# Patient Record
Sex: FEMALE | Race: BLACK OR AFRICAN AMERICAN | NOT HISPANIC OR LATINO | Employment: UNEMPLOYED | RURAL
[De-identification: names, ages, dates, MRNs, and addresses within clinical notes are randomized per-mention and may not be internally consistent; named-entity substitution may affect disease eponyms.]

---

## 2020-07-07 ENCOUNTER — LAB (OUTPATIENT)
Dept: LAB | Facility: HOSPITAL | Age: 55
End: 2020-07-07

## 2020-07-07 ENCOUNTER — OFFICE VISIT (OUTPATIENT)
Dept: FAMILY MEDICINE CLINIC | Facility: CLINIC | Age: 55
End: 2020-07-07

## 2020-07-07 VITALS
OXYGEN SATURATION: 99 % | RESPIRATION RATE: 21 BRPM | DIASTOLIC BLOOD PRESSURE: 80 MMHG | BODY MASS INDEX: 29.44 KG/M2 | SYSTOLIC BLOOD PRESSURE: 122 MMHG | HEART RATE: 104 BPM | TEMPERATURE: 98.6 F | HEIGHT: 66 IN | WEIGHT: 183.2 LBS

## 2020-07-07 DIAGNOSIS — Z11.59 ENCOUNTER FOR HEPATITIS C SCREENING TEST FOR LOW RISK PATIENT: ICD-10-CM

## 2020-07-07 DIAGNOSIS — R53.83 FATIGUE, UNSPECIFIED TYPE: ICD-10-CM

## 2020-07-07 DIAGNOSIS — Z86.2 HISTORY OF ANEMIA: ICD-10-CM

## 2020-07-07 DIAGNOSIS — E78.5 HYPERLIPIDEMIA, UNSPECIFIED HYPERLIPIDEMIA TYPE: ICD-10-CM

## 2020-07-07 DIAGNOSIS — Z00.01 ANNUAL VISIT FOR GENERAL ADULT MEDICAL EXAMINATION WITH ABNORMAL FINDINGS: Primary | ICD-10-CM

## 2020-07-07 DIAGNOSIS — E55.9 VITAMIN D DEFICIENCY: ICD-10-CM

## 2020-07-07 DIAGNOSIS — R13.10 DYSPHAGIA, UNSPECIFIED TYPE: ICD-10-CM

## 2020-07-07 LAB
ALBUMIN SERPL-MCNC: 3.5 G/DL (ref 3.5–5.2)
ALBUMIN/GLOB SERPL: 0.6 G/DL
ALP SERPL-CCNC: 79 U/L (ref 39–117)
ALT SERPL W P-5'-P-CCNC: 7 U/L (ref 1–33)
ANION GAP SERPL CALCULATED.3IONS-SCNC: 11.4 MMOL/L (ref 5–15)
ANISOCYTOSIS BLD QL: ABNORMAL
AST SERPL-CCNC: 7 U/L (ref 1–32)
BASOPHILS # BLD MANUAL: 0.11 10*3/MM3 (ref 0–0.2)
BASOPHILS NFR BLD AUTO: 1 % (ref 0–1.5)
BILIRUB SERPL-MCNC: 0.3 MG/DL (ref 0–1.2)
BUN SERPL-MCNC: 7 MG/DL (ref 6–20)
BUN/CREAT SERPL: 9.3 (ref 7–25)
CALCIUM SPEC-SCNC: 11.4 MG/DL (ref 8.6–10.5)
CHLORIDE SERPL-SCNC: 99 MMOL/L (ref 98–107)
CO2 SERPL-SCNC: 24.6 MMOL/L (ref 22–29)
CREAT SERPL-MCNC: 0.75 MG/DL (ref 0.57–1)
DEPRECATED RDW RBC AUTO: 42.2 FL (ref 37–54)
ERYTHROCYTE [DISTWIDTH] IN BLOOD BY AUTOMATED COUNT: 19.9 % (ref 12.3–15.4)
GFR SERPL CREATININE-BSD FRML MDRD: 98 ML/MIN/1.73
GLOBULIN UR ELPH-MCNC: 5.9 GM/DL
GLUCOSE SERPL-MCNC: 82 MG/DL (ref 65–99)
HCT VFR BLD AUTO: 24 % (ref 34–46.6)
HGB BLD-MCNC: 7 G/DL (ref 12–15.9)
HYPOCHROMIA BLD QL: ABNORMAL
IRON 24H UR-MRATE: 17 MCG/DL (ref 37–145)
IRON SATN MFR SERPL: 9 % (ref 20–50)
LYMPHOCYTES # BLD MANUAL: 2.11 10*3/MM3 (ref 0.7–3.1)
LYMPHOCYTES NFR BLD MANUAL: 19 % (ref 19.6–45.3)
LYMPHOCYTES NFR BLD MANUAL: 7 % (ref 5–12)
MCH RBC QN AUTO: 18.3 PG (ref 26.6–33)
MCHC RBC AUTO-ENTMCNC: 29.2 G/DL (ref 31.5–35.7)
MCV RBC AUTO: 62.7 FL (ref 79–97)
MICROCYTES BLD QL: ABNORMAL
MONOCYTES # BLD AUTO: 0.78 10*3/MM3 (ref 0.1–0.9)
NEUTROPHILS # BLD AUTO: 8.11 10*3/MM3 (ref 1.7–7)
NEUTROPHILS NFR BLD MANUAL: 73 % (ref 42.7–76)
PLAT MORPH BLD: NORMAL
PLATELET # BLD AUTO: 586 10*3/MM3 (ref 140–450)
PMV BLD AUTO: 9.5 FL (ref 6–12)
POLYCHROMASIA BLD QL SMEAR: ABNORMAL
POTASSIUM SERPL-SCNC: 4.3 MMOL/L (ref 3.5–5.2)
PROT SERPL-MCNC: 9.4 G/DL (ref 6–8.5)
RBC # BLD AUTO: 3.83 10*6/MM3 (ref 3.77–5.28)
SODIUM SERPL-SCNC: 135 MMOL/L (ref 136–145)
T4 FREE SERPL-MCNC: 1.22 NG/DL (ref 0.93–1.7)
TIBC SERPL-MCNC: 186 MCG/DL (ref 298–536)
TRANSFERRIN SERPL-MCNC: 125 MG/DL (ref 200–360)
TSH SERPL DL<=0.05 MIU/L-ACNC: 1.12 UIU/ML (ref 0.27–4.2)
WBC # BLD AUTO: 11.11 10*3/MM3 (ref 3.4–10.8)
WBC MORPH BLD: NORMAL

## 2020-07-07 PROCEDURE — 84466 ASSAY OF TRANSFERRIN: CPT

## 2020-07-07 PROCEDURE — 86803 HEPATITIS C AB TEST: CPT

## 2020-07-07 PROCEDURE — 82306 VITAMIN D 25 HYDROXY: CPT

## 2020-07-07 PROCEDURE — 85007 BL SMEAR W/DIFF WBC COUNT: CPT

## 2020-07-07 PROCEDURE — 84443 ASSAY THYROID STIM HORMONE: CPT

## 2020-07-07 PROCEDURE — 80053 COMPREHEN METABOLIC PANEL: CPT

## 2020-07-07 PROCEDURE — 83540 ASSAY OF IRON: CPT

## 2020-07-07 PROCEDURE — 99386 PREV VISIT NEW AGE 40-64: CPT | Performed by: NURSE PRACTITIONER

## 2020-07-07 PROCEDURE — 84439 ASSAY OF FREE THYROXINE: CPT

## 2020-07-07 PROCEDURE — 85025 COMPLETE CBC W/AUTO DIFF WBC: CPT

## 2020-07-07 PROCEDURE — 80061 LIPID PANEL: CPT

## 2020-07-07 RX ORDER — IRBESARTAN 150 MG/1
300 TABLET ORAL NIGHTLY
COMMUNITY
End: 2020-08-07 | Stop reason: SDUPTHER

## 2020-07-07 RX ORDER — PANTOPRAZOLE SODIUM 40 MG/1
40 TABLET, DELAYED RELEASE ORAL DAILY
Qty: 30 TABLET | Refills: 2 | Status: SHIPPED | OUTPATIENT
Start: 2020-07-07 | End: 2020-08-17 | Stop reason: SDUPTHER

## 2020-07-07 RX ORDER — ATORVASTATIN CALCIUM 80 MG/1
80 TABLET, FILM COATED ORAL NIGHTLY
COMMUNITY
End: 2020-08-07 | Stop reason: SDUPTHER

## 2020-07-07 NOTE — PROGRESS NOTES
Chief Complaint   Patient presents with   • [facility]       Subjective:  Kinsey Martinez is a 54 y.o. female who presents to [facility]. Reports she was hospitalized in February in [place] for anemia and was given IV iron infusions. Reports feeling extremely fatigued despite taking Geritol. Reports hx of non-bleeding stomach ulcers that caused her to lose approx 80 pounds since last February. Reports having an upper scope approx Dec 2019 d/t problems with choking - unsure of result - no records to review. Last colonoscopy approx 2018; last PAP 2016 - reports polyps in uterus; unsure of last mammogram - reports cousin with breast CA but no first degree relative. Reports being on BP meds in the past - BP appears normal today. Reports 2 sisters passed d/t colon cancer.       The following portions of the patient's history were reviewed and updated as appropriate: allergies, current medications, past family history, past medical history, past social history, past surgical history and problem list.    Anemia   Presents for initial visit. Symptoms include light-headedness, malaise/fatigue and weight loss. There has been no abdominal pain, bruising/bleeding easily, fever or palpitations. Past treatments include oral iron supplements and parenteral iron supplements. Past medical history includes cancer and recent illness. Procedure history includes colonoscopy and EGD. Family history includes iron deficiency.   Fatigue   This is a chronic problem. The current episode started more than 1 year ago. The problem occurs intermittently. The problem has been waxing and waning. Associated symptoms include fatigue. Pertinent negatives include no abdominal pain, arthralgias, chest pain, congestion, coughing, fever, headaches, joint swelling, myalgias, nausea, swollen glands or vomiting. Nothing aggravates the symptoms. She has tried rest (OTC vitamin) for the symptoms. The treatment provided no relief.   Heartburn   She  complains of heartburn and water brash. She reports no abdominal pain, no chest pain, no coughing, no nausea or no wheezing. This is a chronic problem. The current episode started more than 1 year ago. The problem occurs occasionally. The problem has been waxing and waning. The heartburn is of moderate intensity. The symptoms are aggravated by certain foods. Associated symptoms include fatigue and weight loss. She has tried a PPI for the symptoms. The treatment provided moderate relief. Past procedures include an EGD and a UGI.        Past Medical History:   Diagnosis Date   • Anemia    • GERD (gastroesophageal reflux disease)    • Hypertension          Current Outpatient Medications:   •  atorvastatin (LIPITOR) 80 MG tablet, Take 80 mg by mouth Every Night., Disp: , Rfl:   •  irbesartan (AVAPRO) 150 MG tablet, Take 300 mg by mouth Every Night., Disp: , Rfl:   •  ascorbic acid (VITAMIN C) 1000 MG tablet, Take 1 tablet by mouth Daily., Disp: 30 tablet, Rfl: 0  •  ferrous sulfate (FerrouSul) 325 (65 FE) MG tablet, Take 1 tablet by mouth Daily With Breakfast., Disp: 30 tablet, Rfl: 0  •  Morphine (MSIR) 15 MG tablet, Take 1 tablet by mouth Every 6 (Six) Hours As Needed for Moderate Pain  or Severe Pain  for up to 3 days., Disp: 12 tablet, Rfl: 0  •  ondansetron (Zofran) 4 MG tablet, Take 1 tablet by mouth Every 8 (Eight) Hours As Needed for Nausea or Vomiting., Disp: 21 tablet, Rfl: 0  •  pantoprazole (Protonix) 40 MG EC tablet, Take 1 tablet by mouth Daily., Disp: 30 tablet, Rfl: 2  •  polyethylene glycol (MIRALAX) 17 g packet, Take 17g PO BID PRN constipation, Disp: 60 packet, Rfl: 1    Review of Systems    Review of Systems   Constitutional: Positive for fatigue, malaise/fatigue, unexpected weight change and weight loss. Negative for activity change, appetite change and fever.   HENT: Positive for trouble swallowing. Negative for congestion, dental problem, ear pain, sinus pain, tinnitus and voice change.    Eyes:  "Negative for photophobia, pain and visual disturbance.   Respiratory: Negative for cough, shortness of breath and wheezing.    Cardiovascular: Negative for chest pain, palpitations and leg swelling.   Gastrointestinal: Positive for heartburn. Negative for abdominal pain, blood in stool, constipation, diarrhea, nausea and vomiting.   Endocrine: Negative for cold intolerance, heat intolerance, polydipsia, polyphagia and polyuria.   Genitourinary: Negative for decreased urine volume, flank pain, frequency, pelvic pain and urgency.   Musculoskeletal: Negative for arthralgias, back pain, joint swelling and myalgias.   Allergic/Immunologic: Negative for environmental allergies, food allergies and immunocompromised state.   Neurological: Positive for light-headedness. Negative for dizziness, syncope and headaches.   Hematological: Negative for adenopathy. Does not bruise/bleed easily.   Psychiatric/Behavioral: Negative for sleep disturbance. The patient is not nervous/anxious.        Objective  Vitals:    07/07/20 1020   BP: 122/80   BP Location: Left arm   Patient Position: Sitting   Cuff Size: Adult   Pulse: 104   Resp: 21   Temp: 98.6 °F (37 °C)   SpO2: 99%   Weight: 83.1 kg (183 lb 3.2 oz)   Height: 167.6 cm (66\")       Physical Exam   Constitutional: She is oriented to person, place, and time. She appears well-developed and well-nourished. No distress.   HENT:   Head: Normocephalic and atraumatic.   Right Ear: External ear normal.   Left Ear: External ear normal.   Wearing face mask d/t pandemic   Eyes: Pupils are equal, round, and reactive to light. Conjunctivae and EOM are normal.   Neck: Normal range of motion. Neck supple. No thyromegaly present.   Cardiovascular: Normal rate, regular rhythm, normal heart sounds and intact distal pulses.   No murmur heard.  Pulmonary/Chest: Effort normal and breath sounds normal. No respiratory distress. She has no wheezes.   Abdominal: Soft. Bowel sounds are normal. "   Musculoskeletal: Normal range of motion. She exhibits no edema, tenderness or deformity.   Lymphadenopathy:     She has no cervical adenopathy.   Neurological: She is alert and oriented to person, place, and time.   Skin: Skin is warm and dry. Capillary refill takes less than 2 seconds.   Psychiatric: She has a normal mood and affect. Her behavior is normal. Judgment and thought content normal.   Nursing note and vitals reviewed.        Kinsey was seen today for establish care.    Diagnoses and all orders for this visit:    Annual visit for general adult medical examination with abnormal findings    Dysphagia, unspecified type  -     pantoprazole (Protonix) 40 MG EC tablet; Take 1 tablet by mouth Daily.    Fatigue, unspecified type  -     Comprehensive metabolic panel; Future  -     TSH; Future  -     T4, free; Future  -     Iron and TIBC; Future    History of anemia  -     CBC & Differential; Future  -     Iron and TIBC; Future    Encounter for hepatitis C screening test for low risk patient  -     Hepatitis C antibody; Future    Hyperlipidemia, unspecified hyperlipidemia type  -     Lipid Panel With LDL / HDL Ratio; Future    Vitamin D deficiency  -     Vitamin D 25 hydroxy; Future    1. Annual exam - limited records for review  2. Dysphagia - possibly r/t GERD - has been off PPI, will restart and f/u; if sx still present will refer to GI for further eval.  3. Hx of anemia - will obtain labs today and call with results.  4. Screening labs ordered - will call with results.  5. Discussed immunization needs - pt declined today.  6. Mammogram/PAP/colonoscopy - will address at f/u appt.  7. RTC approx 1 mo for f/u or PRN.      This document has been electronically signed by KIMBERLEE Winston on July 20, 2020 22:11

## 2020-07-08 LAB
25(OH)D3 SERPL-MCNC: 32.8 NG/ML (ref 30–100)
CHOLEST SERPL-MCNC: 158 MG/DL (ref 0–200)
HCV AB SER DONR QL: NORMAL
HDLC SERPL-MCNC: 38 MG/DL (ref 40–60)
LDLC SERPL CALC-MCNC: 105 MG/DL (ref 0–100)
LDLC/HDLC SERPL: 2.76 {RATIO}
TRIGL SERPL-MCNC: 76 MG/DL (ref 0–150)
VLDLC SERPL-MCNC: 15.2 MG/DL (ref 5–40)

## 2020-07-14 ENCOUNTER — OFFICE VISIT (OUTPATIENT)
Dept: FAMILY MEDICINE CLINIC | Facility: CLINIC | Age: 55
End: 2020-07-14

## 2020-07-14 VITALS
TEMPERATURE: 98.5 F | SYSTOLIC BLOOD PRESSURE: 124 MMHG | OXYGEN SATURATION: 99 % | BODY MASS INDEX: 29.51 KG/M2 | HEIGHT: 66 IN | DIASTOLIC BLOOD PRESSURE: 80 MMHG | HEART RATE: 101 BPM | RESPIRATION RATE: 17 BRPM | WEIGHT: 183.6 LBS

## 2020-07-14 DIAGNOSIS — R53.83 FATIGUE, UNSPECIFIED TYPE: Primary | ICD-10-CM

## 2020-07-14 DIAGNOSIS — D64.9 SYMPTOMATIC ANEMIA: ICD-10-CM

## 2020-07-14 DIAGNOSIS — K59.00 CONSTIPATION, UNSPECIFIED CONSTIPATION TYPE: ICD-10-CM

## 2020-07-14 PROCEDURE — 99213 OFFICE O/P EST LOW 20 MIN: CPT | Performed by: NURSE PRACTITIONER

## 2020-07-14 RX ORDER — POLYETHYLENE GLYCOL 3350 17 G/17G
POWDER, FOR SOLUTION ORAL
Qty: 60 PACKET | Refills: 1 | Status: SHIPPED | OUTPATIENT
Start: 2020-07-14

## 2020-07-14 NOTE — PROGRESS NOTES
"Chief Complaint   Patient presents with   • Follow-up     1 week f/u for fatigue        Subjective:  Kinsey Martinez is a 54 y.o. female who presents for f/u on fatigue. Pt reports no improvement in symptoms despite taking OTC Fe supp. Denies any active signs of bleeding. Does report hx of constipation and is currently having. Also here to discuss lab results. Also reports feeling of \"something in throat\" has improved since starting pantoprazole.       7/7/2020  Kinsey Martinez is a 54 y.o. female who presents to [facility]. Reports she was hospitalized in February in [place] for anemia and was given IV iron infusions. Reports feeling extremely fatigued despite taking Geritol. Reports hx of non-bleeding stomach ulcers that caused her to lose approx 80 pounds since last February. Reports having an upper scope approx Dec 2019 d/t problems with choking - unsure of result - no records to review. Last colonoscopy approx 2018; last PAP 2016 - reports polyps in uterus; unsure of last mammogram - reports cousin with breast CA but no first degree relative. Reports being on BP meds in the past - BP appears normal today. Reports 2 sisters passed d/t colon cancer.       The following portions of the patient's history were reviewed and updated as appropriate: allergies, current medications, past family history, past medical history, past social history, past surgical history and problem list.    Fatigue   This is a recurrent problem. The current episode started more than 1 month ago. The problem occurs daily. The problem has been unchanged. Associated symptoms include fatigue and weakness. Pertinent negatives include no coughing, fever, headaches, joint swelling, nausea, rash, sore throat, urinary symptoms or vomiting. The symptoms are aggravated by exertion. She has tried rest (iron supplement) for the symptoms. The treatment provided no relief.   Constipation   This is a chronic problem. The current episode started more " than 1 year ago. The problem has been waxing and waning since onset. The stool is described as firm. Associated symptoms include weight loss. Pertinent negatives include no diarrhea, fever, hematochezia, melena, nausea or vomiting. She has tried laxatives for the symptoms. The treatment provided mild relief.   Anemia   Presents for follow-up visit. Symptoms include malaise/fatigue and weight loss. There has been no fever, light-headedness or pallor. Signs of blood loss that are not present include hematemesis, hematochezia and melena.        Past Medical History:   Diagnosis Date   • Anemia    • GERD (gastroesophageal reflux disease)    • Hypertension          Current Outpatient Medications:   •  ascorbic acid (VITAMIN C) 1000 MG tablet, Take 1 tablet by mouth Daily., Disp: 30 tablet, Rfl: 0  •  atorvastatin (LIPITOR) 80 MG tablet, Take 80 mg by mouth Every Night., Disp: , Rfl:   •  ferrous sulfate (FerrouSul) 325 (65 FE) MG tablet, Take 1 tablet by mouth Daily With Breakfast., Disp: 30 tablet, Rfl: 0  •  irbesartan (AVAPRO) 150 MG tablet, Take 300 mg by mouth Every Night., Disp: , Rfl:   •  ondansetron (Zofran) 4 MG tablet, Take 1 tablet by mouth Every 8 (Eight) Hours As Needed for Nausea or Vomiting., Disp: 21 tablet, Rfl: 0  •  pantoprazole (Protonix) 40 MG EC tablet, Take 1 tablet by mouth Daily., Disp: 30 tablet, Rfl: 2  •  polyethylene glycol (MIRALAX) 17 g packet, Take 17g PO BID PRN constipation, Disp: 60 packet, Rfl: 1    Review of Systems    Review of Systems   Constitutional: Positive for fatigue, malaise/fatigue and weight loss. Negative for activity change, fever and unexpected weight change.   HENT: Negative for sore throat.    Respiratory: Negative for cough, chest tightness, shortness of breath and wheezing.    Gastrointestinal: Positive for constipation. Negative for blood in stool, diarrhea, hematemesis, hematochezia, melena, nausea and vomiting.   Genitourinary: Negative for dysuria and hematuria.  "  Musculoskeletal: Negative for joint swelling.   Skin: Negative for color change, pallor and rash.   Neurological: Positive for weakness. Negative for dizziness, light-headedness and headaches.   Psychiatric/Behavioral: Negative for sleep disturbance.       Objective  Vitals:    07/14/20 1539   BP: 124/80   BP Location: Left arm   Patient Position: Sitting   Cuff Size: Adult   Pulse: 101   Resp: 17   Temp: 98.5 °F (36.9 °C)   SpO2: 99%   Weight: 83.3 kg (183 lb 9.6 oz)   Height: 167.6 cm (66\")   PainSc: 0-No pain       Physical Exam   Constitutional: She is oriented to person, place, and time. She appears well-developed and well-nourished. No distress.   HENT:   Head: Normocephalic and atraumatic.   Wearing face mask d/t pandemic   Eyes: Pupils are equal, round, and reactive to light. Conjunctivae are normal.   Neck: Normal range of motion. Neck supple.   Cardiovascular: Regular rhythm. Tachycardia present.   Pulmonary/Chest: Effort normal and breath sounds normal. No respiratory distress.   Abdominal: Soft. Bowel sounds are normal.   Musculoskeletal: Normal range of motion.   Neurological: She is alert and oriented to person, place, and time.   Skin: Skin is warm and dry.   Psychiatric: She has a normal mood and affect. Her behavior is normal.   Nursing note and vitals reviewed.        Kinsey was seen today for follow-up.    Diagnoses and all orders for this visit:    Fatigue, unspecified type    Symptomatic anemia    Constipation, unspecified constipation type  -     polyethylene glycol (MIRALAX) 17 g packet; Take 17g PO BID PRN constipation    1. Fatigue/symptomatic anemia - Discussed low hgb level most likely cause of fatigue. Also discussed need for referral to hem/onc but pt states she is seeing Dr. Song now. Strongly encouraged pt to go to ER for further evaluation of low hgb. Pt v/u and stated she would go to ER first thing tomorrow but was unable to go today. Discussed importance of going to ER as soon " as possible as we are unsure at this point why her hgb is so low and if she is bleeding somewhere her hgb could fall even lower. Pt v/u and assured me she would go to ER ASAP.   2. Constipation - Miralax RX provided - however constipation may also be worsened d/t low hgb v/s oral Fe supp.  3. Advised to go to ER NOW. Pt v/u.  4. RTC at next scheduled f/u or PRN.      This document has been electronically signed by KIMBERLEE Winston on July 28, 2020 18:51

## 2020-07-15 ENCOUNTER — APPOINTMENT (OUTPATIENT)
Dept: CT IMAGING | Facility: HOSPITAL | Age: 55
End: 2020-07-15

## 2020-07-15 ENCOUNTER — ANESTHESIA (OUTPATIENT)
Dept: GASTROENTEROLOGY | Facility: HOSPITAL | Age: 55
End: 2020-07-15

## 2020-07-15 ENCOUNTER — ANESTHESIA EVENT (OUTPATIENT)
Dept: GASTROENTEROLOGY | Facility: HOSPITAL | Age: 55
End: 2020-07-15

## 2020-07-15 ENCOUNTER — HOSPITAL ENCOUNTER (OUTPATIENT)
Facility: HOSPITAL | Age: 55
Discharge: HOME | End: 2020-07-18
Attending: EMERGENCY MEDICINE | Admitting: INTERNAL MEDICINE

## 2020-07-15 DIAGNOSIS — D64.9 ANEMIA, UNSPECIFIED TYPE: Primary | ICD-10-CM

## 2020-07-15 DIAGNOSIS — N83.8 OVARIAN MASS: ICD-10-CM

## 2020-07-15 DIAGNOSIS — D64.9 SYMPTOMATIC ANEMIA: ICD-10-CM

## 2020-07-15 PROBLEM — G62.9 NEUROPATHY: Status: ACTIVE | Noted: 2020-07-15

## 2020-07-15 PROBLEM — D50.0 IRON DEFICIENCY ANEMIA DUE TO CHRONIC BLOOD LOSS: Status: ACTIVE | Noted: 2020-07-15

## 2020-07-15 LAB
ABO GROUP BLD: NORMAL
ABO GROUP BLD: NORMAL
ANION GAP SERPL CALCULATED.3IONS-SCNC: 11 MMOL/L (ref 5–15)
ANISOCYTOSIS BLD QL: ABNORMAL
APTT PPP: 35.4 SECONDS (ref 20–40.3)
BLD GP AB SCN SERPL QL: NEGATIVE
BUN SERPL-MCNC: 10 MG/DL (ref 6–20)
BUN/CREAT SERPL: 13.9 (ref 7–25)
CALCIUM SPEC-SCNC: 10.4 MG/DL (ref 8.6–10.5)
CHLORIDE SERPL-SCNC: 98 MMOL/L (ref 98–107)
CO2 SERPL-SCNC: 24 MMOL/L (ref 22–29)
CREAT SERPL-MCNC: 0.72 MG/DL (ref 0.57–1)
DEPRECATED RDW RBC AUTO: 46.6 FL (ref 37–54)
ERYTHROCYTE [DISTWIDTH] IN BLOOD BY AUTOMATED COUNT: 20.1 % (ref 12.3–15.4)
FERRITIN SERPL-MCNC: 957.4 NG/ML (ref 13–150)
GFR SERPL CREATININE-BSD FRML MDRD: 103 ML/MIN/1.73
GLUCOSE SERPL-MCNC: 99 MG/DL (ref 65–99)
HBA1C MFR BLD: 5.9 % (ref 4.8–5.6)
HCT VFR BLD AUTO: 23 % (ref 34–46.6)
HGB BLD-MCNC: 6.4 G/DL (ref 12–15.9)
HYPOCHROMIA BLD QL: ABNORMAL
INR PPP: 1.14 (ref 0.8–1.2)
IRON 24H UR-MRATE: 19 MCG/DL (ref 37–145)
IRON SATN MFR SERPL: 10 % (ref 20–50)
LYMPHOCYTES # BLD MANUAL: 1.87 10*3/MM3 (ref 0.7–3.1)
LYMPHOCYTES NFR BLD MANUAL: 16 % (ref 19.6–45.3)
LYMPHOCYTES NFR BLD MANUAL: 7 % (ref 5–12)
Lab: NORMAL
MCH RBC QN AUTO: 18.2 PG (ref 26.6–33)
MCHC RBC AUTO-ENTMCNC: 27.8 G/DL (ref 31.5–35.7)
MCV RBC AUTO: 65.3 FL (ref 79–97)
MONOCYTES # BLD AUTO: 0.82 10*3/MM3 (ref 0.1–0.9)
NEUTROPHILS # BLD AUTO: 9.02 10*3/MM3 (ref 1.7–7)
NEUTROPHILS NFR BLD MANUAL: 77 % (ref 42.7–76)
OVALOCYTES BLD QL SMEAR: ABNORMAL
PLATELET # BLD AUTO: 592 10*3/MM3 (ref 140–450)
PMV BLD AUTO: 8.7 FL (ref 6–12)
POTASSIUM SERPL-SCNC: 3.8 MMOL/L (ref 3.5–5.2)
PROTHROMBIN TIME: 15.1 SECONDS (ref 11.1–15.3)
RBC # BLD AUTO: 3.52 10*6/MM3 (ref 3.77–5.28)
RH BLD: POSITIVE
RH BLD: POSITIVE
SARS-COV-2 N GENE RESP QL NAA+PROBE: NOT DETECTED
SMALL PLATELETS BLD QL SMEAR: ABNORMAL
SODIUM SERPL-SCNC: 133 MMOL/L (ref 136–145)
T&S EXPIRATION DATE: NORMAL
TARGETS BLD QL SMEAR: ABNORMAL
TIBC SERPL-MCNC: 182 MCG/DL (ref 298–536)
TRANSFERRIN SERPL-MCNC: 122 MG/DL (ref 200–360)
WBC # BLD AUTO: 11.71 10*3/MM3 (ref 3.4–10.8)
WBC MORPH BLD: NORMAL

## 2020-07-15 PROCEDURE — 86923 COMPATIBILITY TEST ELECTRIC: CPT

## 2020-07-15 PROCEDURE — 36430 TRANSFUSION BLD/BLD COMPNT: CPT

## 2020-07-15 PROCEDURE — 86900 BLOOD TYPING SEROLOGIC ABO: CPT

## 2020-07-15 PROCEDURE — 83540 ASSAY OF IRON: CPT | Performed by: INTERNAL MEDICINE

## 2020-07-15 PROCEDURE — P9016 RBC LEUKOCYTES REDUCED: HCPCS

## 2020-07-15 PROCEDURE — 86901 BLOOD TYPING SEROLOGIC RH(D): CPT

## 2020-07-15 PROCEDURE — 85007 BL SMEAR W/DIFF WBC COUNT: CPT | Performed by: EMERGENCY MEDICINE

## 2020-07-15 PROCEDURE — 87635 SARS-COV-2 COVID-19 AMP PRB: CPT | Performed by: INTERNAL MEDICINE

## 2020-07-15 PROCEDURE — 85610 PROTHROMBIN TIME: CPT | Performed by: EMERGENCY MEDICINE

## 2020-07-15 PROCEDURE — G0378 HOSPITAL OBSERVATION PER HR: HCPCS

## 2020-07-15 PROCEDURE — 83036 HEMOGLOBIN GLYCOSYLATED A1C: CPT | Performed by: INTERNAL MEDICINE

## 2020-07-15 PROCEDURE — 43239 EGD BIOPSY SINGLE/MULTIPLE: CPT | Performed by: INTERNAL MEDICINE

## 2020-07-15 PROCEDURE — 85025 COMPLETE CBC W/AUTO DIFF WBC: CPT | Performed by: EMERGENCY MEDICINE

## 2020-07-15 PROCEDURE — 74177 CT ABD & PELVIS W/CONTRAST: CPT

## 2020-07-15 PROCEDURE — 80048 BASIC METABOLIC PNL TOTAL CA: CPT | Performed by: EMERGENCY MEDICINE

## 2020-07-15 PROCEDURE — 36415 COLL VENOUS BLD VENIPUNCTURE: CPT | Performed by: EMERGENCY MEDICINE

## 2020-07-15 PROCEDURE — 96374 THER/PROPH/DIAG INJ IV PUSH: CPT

## 2020-07-15 PROCEDURE — 99284 EMERGENCY DEPT VISIT MOD MDM: CPT

## 2020-07-15 PROCEDURE — 82728 ASSAY OF FERRITIN: CPT | Performed by: INTERNAL MEDICINE

## 2020-07-15 PROCEDURE — 25010000002 NA FERRIC GLUC CPLX PER 12.5 MG: Performed by: INTERNAL MEDICINE

## 2020-07-15 PROCEDURE — C9803 HOPD COVID-19 SPEC COLLECT: HCPCS | Performed by: INTERNAL MEDICINE

## 2020-07-15 PROCEDURE — 99219 PR INITIAL OBSERVATION CARE/DAY 50 MINUTES: CPT | Performed by: INTERNAL MEDICINE

## 2020-07-15 PROCEDURE — 86850 RBC ANTIBODY SCREEN: CPT | Performed by: EMERGENCY MEDICINE

## 2020-07-15 PROCEDURE — 25010000002 PROPOFOL 10 MG/ML EMULSION: Performed by: NURSE ANESTHETIST, CERTIFIED REGISTERED

## 2020-07-15 PROCEDURE — 84466 ASSAY OF TRANSFERRIN: CPT | Performed by: INTERNAL MEDICINE

## 2020-07-15 PROCEDURE — 86901 BLOOD TYPING SEROLOGIC RH(D): CPT | Performed by: EMERGENCY MEDICINE

## 2020-07-15 PROCEDURE — 25010000002 IOPAMIDOL 61 % SOLUTION: Performed by: INTERNAL MEDICINE

## 2020-07-15 PROCEDURE — 86900 BLOOD TYPING SEROLOGIC ABO: CPT | Performed by: EMERGENCY MEDICINE

## 2020-07-15 PROCEDURE — 85730 THROMBOPLASTIN TIME PARTIAL: CPT | Performed by: EMERGENCY MEDICINE

## 2020-07-15 RX ORDER — LOSARTAN POTASSIUM 50 MG/1
50 TABLET ORAL NIGHTLY
Status: DISCONTINUED | OUTPATIENT
Start: 2020-07-15 | End: 2020-07-18 | Stop reason: HOSPADM

## 2020-07-15 RX ORDER — PANTOPRAZOLE SODIUM 40 MG/10ML
40 INJECTION, POWDER, LYOPHILIZED, FOR SOLUTION INTRAVENOUS EVERY 12 HOURS SCHEDULED
Status: DISCONTINUED | OUTPATIENT
Start: 2020-07-15 | End: 2020-07-18 | Stop reason: HOSPADM

## 2020-07-15 RX ORDER — ACETAMINOPHEN 325 MG/1
650 TABLET ORAL EVERY 4 HOURS PRN
Status: DISCONTINUED | OUTPATIENT
Start: 2020-07-15 | End: 2020-07-18 | Stop reason: HOSPADM

## 2020-07-15 RX ORDER — PROMETHAZINE HYDROCHLORIDE 25 MG/ML
12.5 INJECTION, SOLUTION INTRAMUSCULAR; INTRAVENOUS ONCE AS NEEDED
Status: DISCONTINUED | OUTPATIENT
Start: 2020-07-15 | End: 2020-07-15 | Stop reason: HOSPADM

## 2020-07-15 RX ORDER — SODIUM CHLORIDE 0.9 % (FLUSH) 0.9 %
10 SYRINGE (ML) INJECTION AS NEEDED
Status: DISCONTINUED | OUTPATIENT
Start: 2020-07-15 | End: 2020-07-18 | Stop reason: HOSPADM

## 2020-07-15 RX ORDER — LIDOCAINE HYDROCHLORIDE 20 MG/ML
INJECTION, SOLUTION INTRAVENOUS AS NEEDED
Status: DISCONTINUED | OUTPATIENT
Start: 2020-07-15 | End: 2020-07-15 | Stop reason: SURG

## 2020-07-15 RX ORDER — POLYETHYLENE GLYCOL 3350 17 G/17G
17 POWDER, FOR SOLUTION ORAL DAILY
Status: DISCONTINUED | OUTPATIENT
Start: 2020-07-15 | End: 2020-07-18 | Stop reason: HOSPADM

## 2020-07-15 RX ORDER — ONDANSETRON 2 MG/ML
4 INJECTION INTRAMUSCULAR; INTRAVENOUS ONCE AS NEEDED
Status: DISCONTINUED | OUTPATIENT
Start: 2020-07-15 | End: 2020-07-15 | Stop reason: SDUPTHER

## 2020-07-15 RX ORDER — DOCUSATE SODIUM 100 MG/1
100 CAPSULE, LIQUID FILLED ORAL 2 TIMES DAILY PRN
Status: DISCONTINUED | OUTPATIENT
Start: 2020-07-15 | End: 2020-07-18 | Stop reason: HOSPADM

## 2020-07-15 RX ORDER — SODIUM CHLORIDE 0.9 % (FLUSH) 0.9 %
10 SYRINGE (ML) INJECTION EVERY 12 HOURS SCHEDULED
Status: DISCONTINUED | OUTPATIENT
Start: 2020-07-15 | End: 2020-07-18 | Stop reason: HOSPADM

## 2020-07-15 RX ORDER — PROMETHAZINE HYDROCHLORIDE 25 MG/1
25 SUPPOSITORY RECTAL ONCE AS NEEDED
Status: DISCONTINUED | OUTPATIENT
Start: 2020-07-15 | End: 2020-07-15 | Stop reason: HOSPADM

## 2020-07-15 RX ORDER — PROMETHAZINE HYDROCHLORIDE 25 MG/1
25 TABLET ORAL ONCE AS NEEDED
Status: DISCONTINUED | OUTPATIENT
Start: 2020-07-15 | End: 2020-07-15 | Stop reason: HOSPADM

## 2020-07-15 RX ORDER — SODIUM CHLORIDE 9 MG/ML
30 INJECTION, SOLUTION INTRAVENOUS CONTINUOUS PRN
Status: DISCONTINUED | OUTPATIENT
Start: 2020-07-15 | End: 2020-07-15

## 2020-07-15 RX ORDER — SODIUM FERRIC GLUCONATE COMPLEX IN SUCROSE 12.5 MG/ML
125 INJECTION INTRAVENOUS ONCE
Status: DISCONTINUED | OUTPATIENT
Start: 2020-07-15 | End: 2020-07-15

## 2020-07-15 RX ORDER — ONDANSETRON 2 MG/ML
4 INJECTION INTRAMUSCULAR; INTRAVENOUS EVERY 6 HOURS PRN
Status: DISCONTINUED | OUTPATIENT
Start: 2020-07-15 | End: 2020-07-18 | Stop reason: HOSPADM

## 2020-07-15 RX ORDER — SODIUM CHLORIDE 9 MG/ML
75 INJECTION, SOLUTION INTRAVENOUS CONTINUOUS
Status: DISCONTINUED | OUTPATIENT
Start: 2020-07-15 | End: 2020-07-18

## 2020-07-15 RX ORDER — PROPOFOL 10 MG/ML
VIAL (ML) INTRAVENOUS AS NEEDED
Status: DISCONTINUED | OUTPATIENT
Start: 2020-07-15 | End: 2020-07-15 | Stop reason: SURG

## 2020-07-15 RX ORDER — ATORVASTATIN CALCIUM 40 MG/1
80 TABLET, FILM COATED ORAL NIGHTLY
Status: DISCONTINUED | OUTPATIENT
Start: 2020-07-16 | End: 2020-07-18 | Stop reason: HOSPADM

## 2020-07-15 RX ADMIN — ACETAMINOPHEN 650 MG: 325 TABLET, FILM COATED ORAL at 19:32

## 2020-07-15 RX ADMIN — PROPOFOL 50 MG: 10 INJECTION, EMULSION INTRAVENOUS at 16:03

## 2020-07-15 RX ADMIN — IOPAMIDOL 90 ML: 612 INJECTION, SOLUTION INTRAVENOUS at 21:04

## 2020-07-15 RX ADMIN — LIDOCAINE HYDROCHLORIDE 50 MG: 20 INJECTION, SOLUTION INTRAVENOUS at 16:03

## 2020-07-15 RX ADMIN — SODIUM CHLORIDE 125 MG: 9 INJECTION, SOLUTION INTRAVENOUS at 21:34

## 2020-07-15 RX ADMIN — POLYETHYLENE GLYCOL 3350 17 G: 17 POWDER, FOR SOLUTION ORAL at 16:51

## 2020-07-15 RX ADMIN — PANTOPRAZOLE SODIUM 40 MG: 40 INJECTION, POWDER, FOR SOLUTION INTRAVENOUS at 13:35

## 2020-07-15 RX ADMIN — PROPOFOL 50 MG: 10 INJECTION, EMULSION INTRAVENOUS at 16:04

## 2020-07-15 RX ADMIN — SODIUM CHLORIDE 75 ML/HR: 9 INJECTION, SOLUTION INTRAVENOUS at 13:35

## 2020-07-15 RX ADMIN — PROPOFOL 50 MG: 10 INJECTION, EMULSION INTRAVENOUS at 16:05

## 2020-07-15 RX ADMIN — SODIUM CHLORIDE, PRESERVATIVE FREE 10 ML: 5 INJECTION INTRAVENOUS at 13:40

## 2020-07-15 RX ADMIN — SODIUM CHLORIDE, PRESERVATIVE FREE 10 ML: 5 INJECTION INTRAVENOUS at 20:34

## 2020-07-15 RX ADMIN — PANTOPRAZOLE SODIUM 40 MG: 40 INJECTION, POWDER, FOR SOLUTION INTRAVENOUS at 20:34

## 2020-07-15 NOTE — CONSULTS
SUBJECTIVE:   7/15/2020    Name: Kinsey Martinez  DOD: 1965    REASON FOR CONSULT: Anemia    Chief Complaint:     Chief Complaint   Patient presents with   • Anemia       Subjective     Patient is 54 y.o. female presents with complaint of fatigue.  Patient has a history of having epic ulcer disease.  Patient has been feeling weak and tired and stating some shortness of breath with walking stairs patient was seen by primary doctor who states she is very anemic and sent her to the emergency room patient has been admitted for evaluation.  Patient denies any blood in the stool at this time.  Patient has not had any melanotic bowel movements..      ROS/HISTORY/ CURRENT MEDICATIONS/OBJECTIVE/VS/PE:   Review of Systems:   Review of Systems    History:     Past Medical History:   Diagnosis Date   • Anemia    • GERD (gastroesophageal reflux disease)    • Hypertension      Past Surgical History:   Procedure Laterality Date   • CARDIAC CATHETERIZATION     •  SECTION     • COLONOSCOPY     • NERVE REPAIR     • POLYPECTOMY       Family History   Problem Relation Age of Onset   • Heart disease Mother         CHF   • Diabetes Mother    • Stroke Father    • Cancer Sister      Social History     Tobacco Use   • Smoking status: Never Smoker   • Smokeless tobacco: Never Used   Substance Use Topics   • Alcohol use: Yes     Frequency: Monthly or less     Comment: occassionally   • Drug use: Defer     Medications Prior to Admission   Medication Sig Dispense Refill Last Dose   • atorvastatin (LIPITOR) 80 MG tablet Take 80 mg by mouth Every Night.   7/15/2020 at Unknown time   • pantoprazole (Protonix) 40 MG EC tablet Take 1 tablet by mouth Daily. 30 tablet 2 7/15/2020 at Unknown time   • irbesartan (AVAPRO) 150 MG tablet Take 300 mg by mouth Every Night.   Taking   • polyethylene glycol (MIRALAX) 17 g packet Take 17g PO BID PRN constipation 60 packet 1      Allergies:  Aspirin; Codeine; and Tramadol    I have reviewed the  patient's medical history, surgical history and family history in the available medical record system.     Current Medications:     Current Facility-Administered Medications   Medication Dose Route Frequency Provider Last Rate Last Dose   • acetaminophen (TYLENOL) tablet 650 mg  650 mg Oral Q4H PRN Kareem Zavala MD       • atorvastatin (LIPITOR) tablet 80 mg  80 mg Oral Daily Kareem Zavala MD       • docusate sodium (COLACE) capsule 100 mg  100 mg Oral BID PRN Kareem Zavala MD       • losartan (COZAAR) tablet 50 mg  50 mg Oral Nightly Kareem Zavala MD       • ondansetron (ZOFRAN) injection 4 mg  4 mg Intravenous Q6H PRN Kareem Zavala MD       • pantoprazole (PROTONIX) injection 40 mg  40 mg Intravenous Q12H Kareem Zavala MD       • polyethylene glycol (MIRALAX) packet 17 g  17 g Oral Daily Kareem Zavala MD       • sodium chloride 0.9 % flush 10 mL  10 mL Intravenous PRN Kareem Zavala MD       • sodium chloride 0.9 % flush 10 mL  10 mL Intravenous Q12H Kareem Zavala MD       • sodium chloride 0.9 % flush 10 mL  10 mL Intravenous PRN Kareem Zavala MD       • sodium chloride 0.9 % infusion  75 mL/hr Intravenous Continuous Kareem Zavala MD       • sodium chloride 0.9 % infusion  30 mL/hr Intravenous Continuous PRN Fidel Carbajal MD           Objective     Physical Exam:   Temp:  [97.5 °F (36.4 °C)-98.5 °F (36.9 °C)] 97.5 °F (36.4 °C)  Heart Rate:  [] 85  Resp:  [16-18] 18  BP: (115-141)/(68-80) 141/74    Physical Exam:  General Appearance:    Alert, cooperative, in no acute distress   Head:    Normocephalic, without obvious abnormality, atraumatic   Eyes:            Lids and lashes normal, conjunctivae and sclerae normal, no   icterus, no pallor, corneas clear, PERRLA   Ears:    Ears appear intact with no abnormalities noted   Throat:   No oral lesions, no thrush, oral mucosa moist   Neck:   No adenopathy, supple, trachea midline, no thyromegaly,  no     carotid bruit, no JVD   Back:     No kyphosis present, no scoliosis present, no skin lesions,       erythema or scars, no tenderness to percussion or                   palpation,   range of motion normal   Lungs:     Clear to auscultation,respirations regular, even and                   unlabored    Heart:    Regular rhythm and normal rate, normal S1 and S2, no            murmur, no gallop, no rub, no click   Breast Exam:    Deferred   Abdomen:     Normal bowel sounds, no masses, no organomegaly, soft        non-tender, non-distended, no guarding, no rebound                 tenderness   Genitalia:    Deferred   Extremities:   Moves all extremities well, no edema, no cyanosis, no              redness   Pulses:   Pulses palpable and equal bilaterally   Skin:   No bleeding, bruising or rash   Lymph nodes:   No palpable adenopathy   Neurologic:   Cranial nerves 2 - 12 grossly intact, sensation intact, DTR        present and equal bilaterally      Results Review:     Lab Results   Component Value Date    WBC 11.71 (H) 07/15/2020    WBC 11.11 (H) 07/07/2020    HGB 6.4 (C) 07/15/2020    HGB 7.0 (L) 07/07/2020    HCT 23.0 (L) 07/15/2020    HCT 24.0 (L) 07/07/2020     (H) 07/15/2020     (H) 07/07/2020             No results found for: LIPASE  Lab Results   Component Value Date    INR 1.14 07/15/2020         Radiology Review:  Imaging Results (Last 72 Hours)     ** No results found for the last 72 hours. **          I reviewed the patient's new clinical results.    I reviewed the patient's new imaging results and agree with the interpretation.     ASSESSMENT/PLAN:   ASSESSMENT: Patient with symptomatic anemia.  Patient has a history of peptic ulcer disease currently not taking any medication patient denies any melena or blood in the stool at this time.  Patient peers to have a chronic anemia but will need blood transfusion at this time.  We will schedule patient for EGD to evaluate    PLAN: #1 because  patient's hemoglobin is less than 7 we will transfuse 2 units packed RBCs.  #2 we will schedule patient for EGD to be done today to evaluate for source of possible upper GI bleed.  The risks, benefits, and alternatives of this procedure have been discussed with the patient or the responsible party. The patient understands and agrees to proceed.         Fidel Carbajal MD  07/15/20  12:26         This document has been electronically signed by Fidel Carbajal MD on July 15, 2020 12:26

## 2020-07-15 NOTE — H&P
[facility] Medicine Services  INPATIENT HISTORY AND PHYSICAL       Patient Care Team:  Marlee Kilgore APRN as PCP - General (Family Medicine)    Chief complaint   Chief Complaint   Patient presents with   • Anemia       Subjective     Patient is a 54 y.o. female with a past medical history of gastric and esophageal ulcers, chronic anemia status post transfusion, essential hypertension and left leg neuropathy.  She presents with complaints of worsening fatigue and shortness of breath on exertion and weight loss of 70 pounds over the past 6 months.    Patient moved to this area from in March 2020.  She had previously been evaluated for chronic anemia with a colonoscopy in December 2018 which was unremarkable and EGD in December 2019 which showed esophageal and gastric ulcers.  Patient was on Protonix and iron therapy for chronic anemia.  However over the past 6 months to 1 year patient has experienced worsening fatigue, dyspnea on exertion as well as weight loss of 70 pounds.  She also has intermittent dizziness on standing.  She had routine blood work at a new PCP last week that showed anemia with hemoglobin of 7G/DL.  Subsequently advised to come to the emergency room for further evaluation.  In addition to the symptoms patient has some nausea and complains of intermittent regurgitation of food and nausea with vomiting episode 3 days ago.  She complains of periumbilical abdominal pain that radiates to the back but denies diarrhea or blood in stools.  Of note patient is on oral iron therapy and complains of constipation.  She denies fevers or chills.  She notes sharp neuropathic pain in her left leg from foot up to her hip and was previously on gabapentin but this was discontinued by her last primary care provider.    Review of Systems   Constitutional: Positive for fatigue. Negative for activity change, appetite change, chills and fever.   HENT: Negative for congestion,  sore throat and trouble swallowing.    Respiratory: Negative for cough, chest tightness, shortness of breath and wheezing.    Cardiovascular: Negative for chest pain, palpitations and leg swelling.   Gastrointestinal: Positive for abdominal pain, constipation, nausea and vomiting. Negative for abdominal distention and diarrhea.   Genitourinary: Negative for difficulty urinating, dysuria and hematuria.   Musculoskeletal: Negative for arthralgias, back pain and myalgias.   Skin: Negative for pallor and rash.   Neurological: Positive for dizziness. Negative for syncope, weakness, light-headedness and headaches.   Hematological: Negative for adenopathy. Does not bruise/bleed easily.   Psychiatric/Behavioral: Negative for agitation and confusion. The patient is not nervous/anxious.      History  Past Medical History:   Diagnosis Date   • Anemia    • GERD (gastroesophageal reflux disease)    • Hypertension      Past Surgical History:   Procedure Laterality Date   • CARDIAC CATHETERIZATION     •  SECTION     • COLONOSCOPY     • NERVE REPAIR     • POLYPECTOMY       Family History   Problem Relation Age of Onset   • Heart disease Mother         CHF   • Diabetes Mother    • Stroke Father    • Cancer Sister      Social History     Tobacco Use   • Smoking status: Never Smoker   • Smokeless tobacco: Never Used   Substance Use Topics   • Alcohol use: Yes     Frequency: Monthly or less     Comment: occassionally   • Drug use: Defer     Medications Prior to Admission   Medication Sig Dispense Refill Last Dose   • atorvastatin (LIPITOR) 80 MG tablet Take 80 mg by mouth Every Night.   7/15/2020 at Unknown time   • pantoprazole (Protonix) 40 MG EC tablet Take 1 tablet by mouth Daily. 30 tablet 2 7/15/2020 at Unknown time   • irbesartan (AVAPRO) 150 MG tablet Take 300 mg by mouth Every Night.   Taking   • polyethylene glycol (MIRALAX) 17 g packet Take 17g PO BID PRN constipation 60 packet 1      Allergies:  Aspirin; Codeine;  and Tramadol  Prior to Admission medications    Medication Sig Start Date End Date Taking? Authorizing Provider   atorvastatin (LIPITOR) 80 MG tablet Take 80 mg by mouth Every Night.   Yes ProviderRomario MD   pantoprazole (Protonix) 40 MG EC tablet Take 1 tablet by mouth Daily. 7/7/20  Yes Marlee Kilgore APRN   irbesartan (AVAPRO) 150 MG tablet Take 300 mg by mouth Every Night.    Provider, MD Romario   polyethylene glycol (MIRALAX) 17 g packet Take 17g PO BID PRN constipation 7/14/20   Marlee Kilgore APRN       I have reviewed the patient's current medications    Objective        Vital Signs  Temp:  [97 °F (36.1 °C)-98 °F (36.7 °C)] 97.5 °F (36.4 °C)  Heart Rate:  [] 81  Resp:  [16-18] 18  BP: (115-144)/(67-78) 144/77      Physical Exam   Constitutional: She is oriented to person, place, and time. She appears well-developed and well-nourished. No distress.   HENT:   Head: Normocephalic and atraumatic.   Mouth/Throat: No oropharyngeal exudate.   Eyes: Pupils are equal, round, and reactive to light. Conjunctivae and EOM are normal. No scleral icterus.   Neck: Normal range of motion. Neck supple. No JVD present. No tracheal deviation present. No thyromegaly present.   Cardiovascular: Normal rate, regular rhythm, normal heart sounds and intact distal pulses. Exam reveals no gallop and no friction rub.   No murmur heard.  Pulmonary/Chest: Effort normal and breath sounds normal. No stridor. No respiratory distress. She has no wheezes. She has no rales. She exhibits no tenderness.   Abdominal: Soft. Bowel sounds are normal. She exhibits no distension and no mass. There is tenderness. There is no rebound and no guarding. No hernia.   Tenderness in epigastric and periumbilical abdominal region.   Musculoskeletal: Normal range of motion. She exhibits no edema, tenderness or deformity.   Lymphadenopathy:     She has no cervical adenopathy.   Neurological: She is alert and oriented to person, place, and  time. No cranial nerve deficit. She exhibits normal muscle tone.   Skin: Skin is warm and dry. No rash noted. She is not diaphoretic. No erythema. No pallor.   Psychiatric: She has a normal mood and affect. Her behavior is normal. Judgment and thought content normal.       Results Review:     Results from last 7 days   Lab Units 07/15/20  0735   SODIUM mmol/L 133*   POTASSIUM mmol/L 3.8   CHLORIDE mmol/L 98   CO2 mmol/L 24.0   BUN mg/dL 10   CREATININE mg/dL 0.72   GLUCOSE mg/dL 99   CALCIUM mg/dL 10.4             Results from last 7 days   Lab Units 07/15/20  0735   WBC 10*3/mm3 11.71*   HEMOGLOBIN g/dL 6.4*   HEMATOCRIT % 23.0*   PLATELETS 10*3/mm3 592*       Results from last 7 days   Lab Units 07/15/20  0735   INR  1.14     Imaging Results (Last 7 Days)     ** No results found for the last 168 hours. **          Assessment / Plan       Hospital Problem List:  Principal Problem:    Symptomatic anemia  Active Problems:    Iron deficiency anemia due to chronic blood loss    Neuropathy  History of gastric and esophageal ulcers  Essential hypertension    Plan  - Patient has anemia with weight loss and previous history of gastric and esophageal ulcers.  Will have gastroenterology consultation for evaluation and upper GI endoscopy  -Transfuse 2 PRBCs  -IV Protonix 40 mg every 12 hours  -IV normal saline at 75 cc an hour  -We will have hematology oncology evaluation due to patient's anemia with history of neuropathy and weight loss. There is some concern for myeloma or hematologic disorder.  -Continue antihypertensive medications  - DVT prophylaxis with SCDs  -CODE STATUS is full code    I discussed the patient's findings and my recommendations with patient.    Kareem Zavala MD  07/15/20  15:47        Part of this note may be an electronic transcription/translation of spoken language to printed text using the Dragon Dictation System.

## 2020-07-15 NOTE — CONSULTS
Kinsey Martinez  [id number]  1965        REASON FOR CONSULTATION:  Anemia, neuropathy   Provide an opinion on any further workup or treatment                             REQUESTING PHYSICIAN:  Kareem Zavala MD    RECORDS OBTAINED:  Records of the patients history including those obtained from the referring provider were reviewed and summarized in detail.      History of Present Illness     This is a pleasant 54-year-old female who was seen in consultation at the request of Dr Zavala for evaluation of anemia and peripheral neuropathy.  Patient unfortunately is a poor historian and most of the history was obtained by reviewing old medical records.  She has past medical history of hypertension, neuropathy, anemia, peptic ulcer disease.  She recently moved from [place] to [place] and most of the care was at [facility] and we do not have these records available.  Patient was admitted to our hospital on July 15, 2020 with severe fatigue, shortness of breath.  In emergency room she was noticed to have severe anemia with hemoglobin of 6.4.  Her MCV was low at 65.3.  In addition, she also had thrombocytosis with platelet count of 592,000 and mild leukocytosis with white blood cell count of 11.71.  Patient tells me that she has been anemic since she was a child.  She has never seen hematologist for her anemia.  She is currently taking oral iron supplements without much benefit.  She has never received IV iron.  She has received blood transfusion in the past.  She tells me that she had esophageal and stomach ulcers in the past.  Her last colonoscopy was in December 2018 and upper endoscopy was in December 2019.  This was in [facility] and we do not have records available.  She denies any bright red blood per rectum or melena.  Denies any hematuria.  I been asked to assist with evaluation and management of her anemia and unexplained peripheral neuropathy.    Past Medical History:   Diagnosis Date   •  Anemia    • GERD (gastroesophageal reflux disease)    • Hypertension         Past Surgical History:   Procedure Laterality Date   • CARDIAC CATHETERIZATION     •  SECTION     • COLONOSCOPY     • NERVE REPAIR     • POLYPECTOMY          No current facility-administered medications on file prior to encounter.      Current Outpatient Medications on File Prior to Encounter   Medication Sig Dispense Refill   • atorvastatin (LIPITOR) 80 MG tablet Take 80 mg by mouth Every Night.     • pantoprazole (Protonix) 40 MG EC tablet Take 1 tablet by mouth Daily. 30 tablet 2   • irbesartan (AVAPRO) 150 MG tablet Take 300 mg by mouth Every Night.     • polyethylene glycol (MIRALAX) 17 g packet Take 17g PO BID PRN constipation 60 packet 1        ALLERGIES:    Allergies   Allergen Reactions   • Aspirin Unknown - High Severity   • Codeine Unknown - High Severity   • Tramadol Unknown - High Severity        Social History     Socioeconomic History   • Marital status: Single     Spouse name: Not on file   • Number of children: Not on file   • Years of education: Not on file   • Highest education level: Not on file   Tobacco Use   • Smoking status: Never Smoker   • Smokeless tobacco: Never Used   Substance and Sexual Activity   • Alcohol use: Yes     Frequency: Monthly or less     Comment: occassionally   • Drug use: Defer   • Sexual activity: Defer        Family History   Problem Relation Age of Onset   • Heart disease Mother         CHF   • Diabetes Mother    • Stroke Father    • Cancer Sister         Review of Systems   Constitutional: Positive for activity change, appetite change, fatigue and unexpected weight change. Negative for chills, diaphoresis and fever.   HENT: Negative for congestion, hearing loss, mouth sores, sinus pressure, sinus pain, sore throat and trouble swallowing.    Eyes: Negative for photophobia, pain and discharge.   Respiratory: Positive for shortness of breath. Negative for cough, chest tightness and  wheezing.    Cardiovascular: Positive for palpitations. Negative for chest pain and leg swelling.   Gastrointestinal: Positive for abdominal pain and nausea. Negative for blood in stool, constipation, diarrhea and vomiting.   Endocrine: Positive for cold intolerance. Negative for heat intolerance, polydipsia, polyphagia and polyuria.   Genitourinary: Negative for difficulty urinating, hematuria, menstrual problem and vaginal bleeding.   Musculoskeletal: Positive for arthralgias, back pain and myalgias. Negative for joint swelling.   Skin: Positive for pallor. Negative for color change, rash and wound.   Allergic/Immunologic: Negative for environmental allergies, food allergies and immunocompromised state.   Neurological: Positive for dizziness, weakness, light-headedness, numbness and headaches. Negative for tremors, seizures and syncope.   Hematological: Negative for adenopathy. Does not bruise/bleed easily.   Psychiatric/Behavioral: Positive for decreased concentration. Negative for agitation, confusion, dysphoric mood and suicidal ideas. The patient is not nervous/anxious.         Objective     Vitals:    07/15/20 1502 07/15/20 1600 07/15/20 1608 07/15/20 1725   BP: 144/77 133/74 133/74 122/60   BP Location: Left arm   Left arm   Patient Position: Lying   Lying   Pulse: 81 78 78 88   Resp: 18 16 16 18   Temp:  97.5 °F (36.4 °C)  98.1 °F (36.7 °C)   TempSrc:    Temporal   SpO2: 96% 100% 100% 100%   Weight:       Height:         No flowsheet data found.    Physical Exam   Constitutional: She is oriented to person, place, and time. She appears well-developed. No distress.   HENT:   Head: Normocephalic and atraumatic.   Nose: Nose normal.   Mouth/Throat: Oropharynx is clear and moist. No oropharyngeal exudate.   Eyes: Pupils are equal, round, and reactive to light. No scleral icterus.   Severe conjunctival pallor   Neck: Normal range of motion. Neck supple. No JVD present. No thyromegaly present.   Cardiovascular:  Normal rate, regular rhythm and normal heart sounds. Exam reveals no friction rub.   No murmur heard.  Pulmonary/Chest: Effort normal and breath sounds normal. No respiratory distress. She has no wheezes. She has no rales.   Abdominal: Soft. Bowel sounds are normal. She exhibits no mass. There is tenderness. There is no rebound and no guarding.   Musculoskeletal: Normal range of motion. She exhibits no edema or deformity.   Lymphadenopathy:     She has no cervical adenopathy.   Neurological: She is alert and oriented to person, place, and time. No cranial nerve deficit or sensory deficit. She exhibits normal muscle tone.   Skin: Skin is dry. No rash noted. There is pallor.   Psychiatric: She has a normal mood and affect. Her behavior is normal. Thought content normal.             RECENT LABS: Independently reviewed and summarized.  Hematology WBC   Date Value Ref Range Status   07/15/2020 11.71 (H) 3.40 - 10.80 10*3/mm3 Final     RBC   Date Value Ref Range Status   07/15/2020 3.52 (L) 3.77 - 5.28 10*6/mm3 Final     Hemoglobin   Date Value Ref Range Status   07/15/2020 6.4 (C) 12.0 - 15.9 g/dL Final     Comment:     SPECIMEN REANALYZED TO CONFIRM HGB RESULTS     Hematocrit   Date Value Ref Range Status   07/15/2020 23.0 (L) 34.0 - 46.6 % Final     Platelets   Date Value Ref Range Status   07/15/2020 592 (H) 140 - 450 10*3/mm3 Final        Result of upper endoscopy reviewed.  This showed gastritis without any peptic ulcer disease.  Normal esophagus and duodenum.    I will request old medical records from [facility] for us to review.    Diagnosis:   (1) Anemia   (2) Thrombocytosis  (3) Neuropathy   (4) Abdominal pain   (5) Unintentional weight loss     All are new diagnosis/problems for me.     Assessment/Plan     (1) Anemia     Patient admitted with severe anemia as well as microcytosis.  Patient reports history of lifelong anemia.  She never had any hematology evaluation performed.  She has been taking oral iron  supplements.  Now admitted with severe symptomatic anemia.  Given severity of anemia I would recommend 2 units of packed red blood cell transfusion.  Risk versus benefits of blood transfusion discussed and she was in agreement.  Her iron saturation was low so we will start her on IV iron today.  Risk versus benefit of IV iron including risk of allergic reaction discussed at length.  She was in agreement.  Her upper endoscopy did not show any bleeding source.  She might need colonoscopy to evaluate and rule out other causes of GI bleeding.  I will check vitamin B12, folic acid to rule out other nutritional causes of anemia.  She denies any history of thalassemia or sickle cell disorder.  However we will check hemoglobin electrophoresis to rule out concomitant thalassemias which can also cause microcytic anemia, especially given her history of lifelong anemia.      (2) Thrombocytosis: Likely this is secondary to iron deficiency.  She does not have any prior history of arterial venous thrombosis.  No palpable hepatosplenomegaly.  We will monitor platelet count closely and if no improvement after IV iron replacement I will consider MPN testing.    (3) Neuropathy: Unexplained.  I will check serum protein electrophoresis, immunofixation and free light chains to rule out plasma cell disorder.    (4) Abdominal pain (5) Unintentional weight loss     Patient has right lower quadrant abdominal pain and tenderness on exam.  Given history of unintentional weight loss I will order CT scan of abdomen pelvis to rule out underlying malignant process.    Thank you for involving me in Ms Martinez' care.     Please call us if any questions/concerns.     Mervin Kimbrough MD   Hematology Oncology

## 2020-07-15 NOTE — ANESTHESIA POSTPROCEDURE EVALUATION
Patient: Kinsey Martinez    Procedure Summary     Date:  07/15/20 Room / Location:  [facility] ENDOSCOPY 1 / [facility] ENDOSCOPY    Anesthesia Start:  1601 Anesthesia Stop:  1606    Procedure:  ESOPHAGOGASTRODUODENOSCOPY (N/A ) Diagnosis:       Symptomatic anemia      (Symptomatic anemia [D64.9])    Surgeon:  Fidel Carbajal MD Provider:  Teo Santos CRNA    Anesthesia Type:  MAC ASA Status:  3          Anesthesia Type: MAC    Vitals  No vitals data found for the desired time range.          Post Anesthesia Care and Evaluation    Patient location during evaluation: bedside  Patient participation: complete - patient cannot participate  Level of consciousness: awake  Pain score: 0  Pain management: adequate  Airway patency: patent  Anesthetic complications: No anesthetic complications  PONV Status: none  Cardiovascular status: acceptable  Respiratory status: acceptable  Hydration status: acceptable

## 2020-07-15 NOTE — ED PROVIDER NOTES
Subjective   Patient presents emergency department complaint of weakness and fatigue.  Patient notes that she has chronic anemia of unknown etiology.  Patient has had work-up prior in her prior home state of [place].  Patient states that her last transfusion was in March.  Patient states she did well for about 2 weeks and then began to feel weak and fatigued again before the coven outbreak.  Patient has recently established moved [place].  Patient established with a primary care physician and had some baseline labs drawn which showed a hemoglobin of 7.0.  Patient was referred to the emergency department for possible transfusion.  Patient denies any fevers chills or illnesses.  Patient states she is not losing blood anywhere no bright red per lactone.  Patient does have dark stools when she takes her iron and she is currently doing that at this time.  No melena.          Review of Systems   Constitutional: Positive for fatigue. Negative for appetite change, chills and fever.   HENT: Negative.  Negative for congestion.    Eyes: Negative.  Negative for photophobia and visual disturbance.   Respiratory: Negative.  Negative for cough, chest tightness and shortness of breath.    Cardiovascular: Negative.  Negative for chest pain and palpitations.   Gastrointestinal: Negative.  Negative for abdominal pain, constipation, diarrhea, nausea and vomiting.   Endocrine: Negative.    Genitourinary: Negative.  Negative for decreased urine volume, dysuria, flank pain and hematuria.   Musculoskeletal: Negative.  Negative for arthralgias, back pain, myalgias, neck pain and neck stiffness.   Skin: Negative.  Negative for pallor.   Neurological: Positive for weakness. Negative for dizziness, syncope, light-headedness, numbness and headaches.   Psychiatric/Behavioral: Negative.  Negative for confusion and suicidal ideas. The patient is not nervous/anxious.    All other systems reviewed and are negative.      Past Medical History:    Diagnosis Date   • Anemia    • GERD (gastroesophageal reflux disease)    • Hypertension        Allergies   Allergen Reactions   • Aspirin Unknown - High Severity   • Codeine Unknown - High Severity   • Tramadol Unknown - High Severity       Past Surgical History:   Procedure Laterality Date   • CARDIAC CATHETERIZATION     •  SECTION     • COLONOSCOPY     • NERVE REPAIR     • POLYPECTOMY         Family History   Problem Relation Age of Onset   • Heart disease Mother         CHF   • Diabetes Mother    • Stroke Father    • Cancer Sister        Social History     Socioeconomic History   • Marital status: Single     Spouse name: Not on file   • Number of children: Not on file   • Years of education: Not on file   • Highest education level: Not on file   Tobacco Use   • Smoking status: Never Smoker   • Smokeless tobacco: Never Used   Substance and Sexual Activity   • Alcohol use: Yes     Frequency: Monthly or less     Comment: occassionally   • Drug use: Defer   • Sexual activity: Defer           Objective   Physical Exam   Constitutional: She is oriented to person, place, and time. She appears well-developed and well-nourished. No distress.   HENT:   Head: Normocephalic and atraumatic.   Nose: Nose normal.   Mouth/Throat: Oropharynx is clear and moist.   Eyes: Conjunctivae and EOM are normal. No scleral icterus.   Neck: Normal range of motion. Neck supple. No JVD present.   Cardiovascular: Normal rate, regular rhythm, normal heart sounds and intact distal pulses. Exam reveals no gallop and no friction rub.   No murmur heard.  Pulmonary/Chest: Effort normal. No respiratory distress. She has no wheezes. She has no rales. She exhibits no tenderness.   Abdominal: Soft. She exhibits no distension and no mass. There is no tenderness. There is no rebound and no guarding.   Musculoskeletal: Normal range of motion. She exhibits no edema, tenderness or deformity.   Lymphadenopathy:     She has no cervical adenopathy.    Neurological: She is alert and oriented to person, place, and time. No cranial nerve deficit. She exhibits normal muscle tone.   Skin: Skin is warm and dry. No rash noted. She is not diaphoretic. No erythema. There is pallor.   Psychiatric: She has a normal mood and affect. Her behavior is normal. Judgment and thought content normal.   Nursing note and vitals reviewed.      Procedures           ED Course                                 Labs Reviewed   BASIC METABOLIC PANEL - Abnormal; Notable for the following components:       Result Value    Sodium 133 (*)     All other components within normal limits    Narrative:     GFR Normal >60  Chronic Kidney Disease <60  Kidney Failure <15     CBC WITH AUTO DIFFERENTIAL - Abnormal; Notable for the following components:    WBC 11.71 (*)     RBC 3.52 (*)     Hemoglobin 6.4 (*)     Hematocrit 23.0 (*)     MCV 65.3 (*)     MCH 18.2 (*)     MCHC 27.8 (*)     RDW 20.1 (*)     Platelets 592 (*)     All other components within normal limits   MANUAL DIFFERENTIAL - Abnormal; Notable for the following components:    Neutrophil % 77.0 (*)     Lymphocyte % 16.0 (*)     Neutrophils Absolute 9.02 (*)     All other components within normal limits   IRON PROFILE - Abnormal; Notable for the following components:    Iron 19 (*)     Iron Saturation 10 (*)     Transferrin 122 (*)     TIBC 182 (*)     All other components within normal limits   FERRITIN - Abnormal; Notable for the following components:    Ferritin 957.40 (*)     All other components within normal limits    Narrative:     Results may be falsely decreased if patient taking Biotin.     HEMOGLOBIN A1C - Abnormal; Notable for the following components:    Hemoglobin A1C 5.90 (*)     All other components within normal limits    Narrative:     Hemoglobin A1C Ranges:    Increased Risk for Diabetes  5.7% to 6.4%  Diabetes                     >= 6.5%  Diabetic Goal                < 7.0%   PROTIME-INR - Normal    Narrative:      Therapeutic range for most indications is 2.0-3.0 INR,  or 2.5-3.5 for mechanical heart valves.   APTT - Normal    Narrative:     The recommended Heparin therapeutic range is 68-97 seconds.   COVID PRE-OP / PRE-PROCEDURE SCREENING ORDER (NO ISOLATION)    Narrative:     The following orders were created for panel order COVID PRE-OP / PRE-PROCEDURE SCREENING ORDER (NO ISOLATION) - Swab, Nasopharynx.  Procedure                               Abnormality         Status                     ---------                               -----------         ------                     COVID-19,  [facility] IN-HOUS...[[id number]]                      In process                   Please view results for these tests on the individual orders.   COVID-19, [facility] IN-HOUSE, NP SWAB IN TRANSPORT MEDIA 8-10 HR TAT   TYPE AND SCREEN   PREVIOUS HISTORY   [facility] 2ND SPECIMEN VERIFICATION   PREPARE RBC   TISSUE PATHOLOGY EXAM   CBC AND DIFFERENTIAL    Narrative:     The following orders were created for panel order CBC & Differential.  Procedure                               Abnormality         Status                     ---------                               -----------         ------                     CBC Auto Differential[[id number]]        Abnormal            Final result                 Please view results for these tests on the individual orders.   EXTRA TUBES    Narrative:     The following orders were created for panel order Extra Tubes.  Procedure                               Abnormality         Status                     ---------                               -----------         ------                     Gold Top - SST[[id number]]                                                                Please view results for these tests on the individual orders.   GOLD TOP - SST       No orders to display     Current symptomatic anemia with hemoglobin of 6.4.  Patient will be admitted for transfusion and further work-up.          [facility]    Final  diagnoses:   Anemia, unspecified type   Symptomatic anemia            Diego Frank MD  07/15/20 7848

## 2020-07-15 NOTE — ANESTHESIA POSTPROCEDURE EVALUATION
Patient: Kinsey Martinez    Procedure Summary     Date:  07/15/20 Room / Location:  [facility] ENDOSCOPY 1 / [facility] ENDOSCOPY    Anesthesia Start:  1601 Anesthesia Stop:  1606    Procedure:  ESOPHAGOGASTRODUODENOSCOPY (N/A ) Diagnosis:       Symptomatic anemia      (Symptomatic anemia [D64.9])    Surgeon:  Fidel Carbajal MD Provider:  Teo Santos CRNA    Anesthesia Type:  MAC ASA Status:  3          Anesthesia Type: MAC    Vitals  No vitals data found for the desired time range.          Post Anesthesia Care and Evaluation    Patient location during evaluation: bedside  Patient participation: complete - patient cannot participate  Level of consciousness: awake  Pain score: 0  Pain management: adequate  Airway patency: patent  Anesthetic complications: No anesthetic complications  PONV Status: none  Cardiovascular status: acceptable  Respiratory status: acceptable  Hydration status: acceptable

## 2020-07-15 NOTE — ED TRIAGE NOTES
Pt had blood drawn around a week ago and received results yesterday. Hgb 7.0 Hct 24. Pt referred to ED by PCP.

## 2020-07-15 NOTE — ANESTHESIA PREPROCEDURE EVALUATION
Anesthesia Evaluation     NPO Solid Status: > 8 hours  NPO Liquid Status: > 8 hours           Airway   Mallampati: III  TM distance: >3 FB  Neck ROM: full  No difficulty expected  Dental - normal exam     Pulmonary - normal exam   Cardiovascular - normal exam    (+) hypertension,       Neuro/Psych  GI/Hepatic/Renal/Endo    (+)  GERD,      Musculoskeletal     Abdominal    Substance History      OB/GYN          Other                        Anesthesia Plan    ASA 3     MAC     intravenous induction     Anesthetic plan, all risks, benefits, and alternatives have been provided, discussed and informed consent has been obtained with: patient.

## 2020-07-15 NOTE — SIGNIFICANT NOTE
Blood transfusion consent discussion documentation    I discussed the need for blood transfusion with the patient and included the possible benefits of increased oxygenation and improvement in fatigue. I also informed patient about possible risks including allergic reactions, fever and infection with diseases such as viral hepatitis, Acquired Immune Deficiency Syndrome (AIDS), and other possible infections. Although in most cases the risks and consequences are small, in some cases serious injury and/or death may result.  ?  Patient expressed full understanding of the risks and benefits of blood transfusion and gave consent for blood and blood product transfusion.

## 2020-07-16 ENCOUNTER — APPOINTMENT (OUTPATIENT)
Dept: GENERAL RADIOLOGY | Facility: HOSPITAL | Age: 55
End: 2020-07-16

## 2020-07-16 LAB
ANION GAP SERPL CALCULATED.3IONS-SCNC: 10 MMOL/L (ref 5–15)
BASOPHILS # BLD AUTO: 0.04 10*3/MM3 (ref 0–0.2)
BASOPHILS NFR BLD AUTO: 0.3 % (ref 0–1.5)
BUN SERPL-MCNC: 7 MG/DL (ref 6–20)
BUN/CREAT SERPL: 10.1 (ref 7–25)
CALCIUM SPEC-SCNC: 9.7 MG/DL (ref 8.6–10.5)
CANCER AG125 SERPL QL: 522.8 U/ML (ref 0–38.1)
CHLORIDE SERPL-SCNC: 102 MMOL/L (ref 98–107)
CO2 SERPL-SCNC: 22 MMOL/L (ref 22–29)
CREAT SERPL-MCNC: 0.69 MG/DL (ref 0.57–1)
DEPRECATED RDW RBC AUTO: 50.3 FL (ref 37–54)
EOSINOPHIL # BLD AUTO: 0.07 10*3/MM3 (ref 0–0.4)
EOSINOPHIL NFR BLD AUTO: 0.5 % (ref 0.3–6.2)
ERYTHROCYTE [DISTWIDTH] IN BLOOD BY AUTOMATED COUNT: 20.8 % (ref 12.3–15.4)
FOLATE SERPL-MCNC: 9.07 NG/ML (ref 4.78–24.2)
GFR SERPL CREATININE-BSD FRML MDRD: 108 ML/MIN/1.73
GLUCOSE SERPL-MCNC: 92 MG/DL (ref 65–99)
HCT VFR BLD AUTO: 28 % (ref 34–46.6)
HGB BLD-MCNC: 8.3 G/DL (ref 12–15.9)
HGB RETIC QN AUTO: 20.5 PG (ref 29.8–36.1)
IMM GRANULOCYTES # BLD AUTO: 0.05 10*3/MM3 (ref 0–0.05)
IMM GRANULOCYTES NFR BLD AUTO: 0.4 % (ref 0–0.5)
IMM RETICS NFR: 22.9 % (ref 3–15.8)
LYMPHOCYTES # BLD AUTO: 2.38 10*3/MM3 (ref 0.7–3.1)
LYMPHOCYTES NFR BLD AUTO: 17.9 % (ref 19.6–45.3)
MCH RBC QN AUTO: 20.1 PG (ref 26.6–33)
MCHC RBC AUTO-ENTMCNC: 29.6 G/DL (ref 31.5–35.7)
MCV RBC AUTO: 68 FL (ref 79–97)
MONOCYTES # BLD AUTO: 1.04 10*3/MM3 (ref 0.1–0.9)
MONOCYTES NFR BLD AUTO: 7.8 % (ref 5–12)
NEUTROPHILS NFR BLD AUTO: 73.1 % (ref 42.7–76)
NEUTROPHILS NFR BLD AUTO: 9.71 10*3/MM3 (ref 1.7–7)
NRBC BLD AUTO-RTO: 0 /100 WBC (ref 0–0.2)
PLATELET # BLD AUTO: 528 10*3/MM3 (ref 140–450)
PMV BLD AUTO: 8.6 FL (ref 6–12)
POTASSIUM SERPL-SCNC: 4 MMOL/L (ref 3.5–5.2)
RBC # BLD AUTO: 4.12 10*6/MM3 (ref 3.77–5.28)
RETICS/RBC NFR AUTO: 0.94 % (ref 0.7–1.9)
SODIUM SERPL-SCNC: 134 MMOL/L (ref 136–145)
VIT B12 BLD-MCNC: 590 PG/ML (ref 211–946)
WBC # BLD AUTO: 13.29 10*3/MM3 (ref 3.4–10.8)

## 2020-07-16 PROCEDURE — 82784 ASSAY IGA/IGD/IGG/IGM EACH: CPT | Performed by: INTERNAL MEDICINE

## 2020-07-16 PROCEDURE — 83883 ASSAY NEPHELOMETRY NOT SPEC: CPT | Performed by: INTERNAL MEDICINE

## 2020-07-16 PROCEDURE — 82607 VITAMIN B-12: CPT | Performed by: INTERNAL MEDICINE

## 2020-07-16 PROCEDURE — 84165 PROTEIN E-PHORESIS SERUM: CPT | Performed by: INTERNAL MEDICINE

## 2020-07-16 PROCEDURE — 99243 OFF/OP CNSLTJ NEW/EST LOW 30: CPT | Performed by: OBSTETRICS & GYNECOLOGY

## 2020-07-16 PROCEDURE — 83021 HEMOGLOBIN CHROMOTOGRAPHY: CPT | Performed by: INTERNAL MEDICINE

## 2020-07-16 PROCEDURE — 82378 CARCINOEMBRYONIC ANTIGEN: CPT | Performed by: INTERNAL MEDICINE

## 2020-07-16 PROCEDURE — 99226 PR SBSQ OBSERVATION CARE/DAY 35 MINUTES: CPT | Performed by: INTERNAL MEDICINE

## 2020-07-16 PROCEDURE — G0378 HOSPITAL OBSERVATION PER HR: HCPCS

## 2020-07-16 PROCEDURE — 99214 OFFICE O/P EST MOD 30 MIN: CPT | Performed by: INTERNAL MEDICINE

## 2020-07-16 PROCEDURE — 85046 RETICYTE/HGB CONCENTRATE: CPT | Performed by: INTERNAL MEDICINE

## 2020-07-16 PROCEDURE — 86304 IMMUNOASSAY TUMOR CA 125: CPT | Performed by: INTERNAL MEDICINE

## 2020-07-16 PROCEDURE — 71045 X-RAY EXAM CHEST 1 VIEW: CPT

## 2020-07-16 PROCEDURE — 80048 BASIC METABOLIC PNL TOTAL CA: CPT | Performed by: INTERNAL MEDICINE

## 2020-07-16 PROCEDURE — 82746 ASSAY OF FOLIC ACID SERUM: CPT | Performed by: INTERNAL MEDICINE

## 2020-07-16 PROCEDURE — 85025 COMPLETE CBC W/AUTO DIFF WBC: CPT | Performed by: INTERNAL MEDICINE

## 2020-07-16 PROCEDURE — 86334 IMMUNOFIX E-PHORESIS SERUM: CPT | Performed by: INTERNAL MEDICINE

## 2020-07-16 PROCEDURE — 84155 ASSAY OF PROTEIN SERUM: CPT | Performed by: INTERNAL MEDICINE

## 2020-07-16 PROCEDURE — 25010000002 NA FERRIC GLUC CPLX PER 12.5 MG: Performed by: INTERNAL MEDICINE

## 2020-07-16 PROCEDURE — 85660 RBC SICKLE CELL TEST: CPT | Performed by: INTERNAL MEDICINE

## 2020-07-16 RX ORDER — MORPHINE SULFATE 15 MG/1
15 TABLET ORAL EVERY 6 HOURS PRN
Status: DISCONTINUED | OUTPATIENT
Start: 2020-07-16 | End: 2020-07-18 | Stop reason: HOSPADM

## 2020-07-16 RX ORDER — SODIUM CHLORIDE 9 MG/ML
30 INJECTION, SOLUTION INTRAVENOUS CONTINUOUS PRN
Status: DISCONTINUED | OUTPATIENT
Start: 2020-07-16 | End: 2020-07-18 | Stop reason: HOSPADM

## 2020-07-16 RX ADMIN — PANTOPRAZOLE SODIUM 40 MG: 40 INJECTION, POWDER, FOR SOLUTION INTRAVENOUS at 08:32

## 2020-07-16 RX ADMIN — SODIUM CHLORIDE 125 MG: 9 INJECTION, SOLUTION INTRAVENOUS at 18:31

## 2020-07-16 RX ADMIN — SODIUM CHLORIDE 75 ML/HR: 9 INJECTION, SOLUTION INTRAVENOUS at 11:46

## 2020-07-16 RX ADMIN — MORPHINE SULFATE 15 MG: 15 TABLET ORAL at 13:59

## 2020-07-16 RX ADMIN — PANTOPRAZOLE SODIUM 40 MG: 40 INJECTION, POWDER, FOR SOLUTION INTRAVENOUS at 20:14

## 2020-07-16 RX ADMIN — POLYETHYLENE GLYCOL 3350 17 G: 17 POWDER, FOR SOLUTION ORAL at 08:33

## 2020-07-16 RX ADMIN — ATORVASTATIN CALCIUM 80 MG: 40 TABLET, FILM COATED ORAL at 20:14

## 2020-07-16 RX ADMIN — SODIUM CHLORIDE, PRESERVATIVE FREE 10 ML: 5 INJECTION INTRAVENOUS at 20:14

## 2020-07-16 RX ADMIN — POLYETHYLENE GLYCOL 3350, SODIUM SULFATE ANHYDROUS, SODIUM BICARBONATE, SODIUM CHLORIDE, POTASSIUM CHLORIDE 4000 ML: 236; 22.74; 6.74; 5.86; 2.97 POWDER, FOR SOLUTION ORAL at 17:35

## 2020-07-16 RX ADMIN — LOSARTAN POTASSIUM 50 MG: 50 TABLET, FILM COATED ORAL at 20:14

## 2020-07-16 NOTE — PROGRESS NOTES
SUBJECTIVE:   2020  Chief Complaint:     Subjective      Patient is 54 y.o. female with symptomatic anemia.  EGD did not show any source of bleeding.  Patient has not had a recent colonoscopy.    History:  Past Medical History:   Diagnosis Date   • Anemia    • GERD (gastroesophageal reflux disease)    • Hypertension      Past Surgical History:   Procedure Laterality Date   • CARDIAC CATHETERIZATION     •  SECTION     • COLONOSCOPY     • NERVE REPAIR     • POLYPECTOMY       Family History   Problem Relation Age of Onset   • Heart disease Mother         CHF   • Diabetes Mother    • Stroke Father    • Cancer Sister      Social History     Tobacco Use   • Smoking status: Never Smoker   • Smokeless tobacco: Never Used   Substance Use Topics   • Alcohol use: Yes     Frequency: Monthly or less     Comment: occassionally   • Drug use: Defer     Medications Prior to Admission   Medication Sig Dispense Refill Last Dose   • atorvastatin (LIPITOR) 80 MG tablet Take 80 mg by mouth Every Night.   7/15/2020 at Unknown time   • pantoprazole (Protonix) 40 MG EC tablet Take 1 tablet by mouth Daily. 30 tablet 2 7/15/2020 at Unknown time   • irbesartan (AVAPRO) 150 MG tablet Take 300 mg by mouth Every Night.   Taking   • polyethylene glycol (MIRALAX) 17 g packet Take 17g PO BID PRN constipation 60 packet 1      Allergies:  Aspirin; Codeine; and Tramadol     CURRENT MEDICATIONS/OBJECTIVE/VS/PE:     Current Medications:     Current Facility-Administered Medications   Medication Dose Route Frequency Provider Last Rate Last Dose   • acetaminophen (TYLENOL) tablet 650 mg  650 mg Oral Q4H PRN Fidel Carbajal MD   650 mg at 07/15/20 1932   • atorvastatin (LIPITOR) tablet 80 mg  80 mg Oral Nightly Fidel Carbajal MD       • docusate sodium (COLACE) capsule 100 mg  100 mg Oral BID PRN Fidel Carbajal MD       • losartan (COZAAR) tablet 50 mg  50 mg Oral Nightly Fidel Carbajal MD       • Morphine (MSIR) tablet 15 mg  15  mg Oral Q6H PRN Maria Kimbrough MD       • ondansetron (ZOFRAN) injection 4 mg  4 mg Intravenous Q6H PRN Fidel Carbajal MD       • pantoprazole (PROTONIX) injection 40 mg  40 mg Intravenous Q12H Fidel Carbajal MD   40 mg at 07/16/20 0832   • polyethylene glycol (GoLYTELY) solution 4,000 mL  4,000 mL Oral Once Fidel Carbajal MD       • polyethylene glycol (MIRALAX) packet 17 g  17 g Oral Daily Fidel Carbajal MD   17 g at 07/16/20 0833   • sodium chloride 0.9 % flush 10 mL  10 mL Intravenous PRN Fidel Carbajal MD       • sodium chloride 0.9 % flush 10 mL  10 mL Intravenous Q12H Fidel Carbajal MD   10 mL at 07/15/20 2034   • sodium chloride 0.9 % flush 10 mL  10 mL Intravenous PRN Fidel Carbajal MD       • sodium chloride 0.9 % infusion  75 mL/hr Intravenous Continuous Fidel Carbajal MD 75 mL/hr at 07/16/20 1146 75 mL/hr at 07/16/20 1146   • sodium chloride 0.9 % infusion  30 mL/hr Intravenous Continuous PRN Fidel Carbajal MD           Objective     Review of Systems:   Review of Systems    Physical Exam:   Temp:  [97 °F (36.1 °C)-100.6 °F (38.1 °C)] 97.8 °F (36.6 °C)  Heart Rate:  [] 97  Resp:  [16-18] 18  BP: (111-144)/(57-95) 128/74     Physical Exam:  General Appearance:    Alert, cooperative, in no acute distress   Head:    Normocephalic, without obvious abnormality, atraumatic   Eyes:            Lids and lashes normal, conjunctivae and sclerae normal, no   icterus, no pallor, corneas clear, PERRLA   Ears:    Ears appear intact with no abnormalities noted   Throat:   No oral lesions, no thrush, oral mucosa moist   Neck:   No adenopathy, supple, trachea midline, no thyromegaly, no     carotid bruit, no JVD   Back:     No kyphosis present, no scoliosis present, no skin lesions,       erythema or scars, no tenderness to percussion or                   palpation,   range of motion normal   Lungs:     Clear to auscultation,respirations regular, even and                   unlabored     Heart:    Regular rhythm and normal rate, normal S1 and S2, no            murmur, no gallop, no rub, no click   Breast Exam:    Deferred   Abdomen:     Normal bowel sounds, no masses, no organomegaly, soft        non-tender, non-distended, no guarding, no rebound                 tenderness   Genitalia:    Deferred   Extremities:   Moves all extremities well, no edema, no cyanosis, no              redness   Pulses:   Pulses palpable and equal bilaterally   Skin:   No bleeding, bruising or rash   Lymph nodes:   No palpable adenopathy   Neurologic:   Cranial nerves 2 - 12 grossly intact, sensation intact, DTR        present and equal bilaterally      Results Review:     Lab Results (last 24 hours)     Procedure Component Value Units Date/Time    Folate [[id number]]  (Normal) Collected:  07/16/20 0500    Specimen:  Blood Updated:  07/16/20 1211     Folate 9.07 ng/mL     Narrative:       Results may be falsely increased if patient taking Biotin.      Vitamin B12 [[id number]]  (Normal) Collected:  07/16/20 0500    Specimen:  Blood Updated:  07/16/20 1211     Vitamin B-12 590 pg/mL     Narrative:       Results may be falsely increased if patient taking Biotin.      CEA [[id number]] Collected:  07/16/20 0950    Specimen:  Blood Updated:  07/16/20 0955    Protein Elec + Interp, Serum [[id number]] Collected:  07/16/20 0927    Specimen:  Blood Updated:  07/16/20 0927    Immunofixation, Serum [[id number]] Collected:  07/16/20 0900    Specimen:  Blood Updated:  07/16/20 0926     [[id number]] Collected:  07/16/20 0500    Specimen:  Blood Updated:  07/16/20 0917    Tissue Pathology Exam [[id number]] Collected:  07/15/20 1607    Specimen:  Tissue from Gastric, Antrum Updated:  07/16/20 0726    Retic With IRF & RET-He [[id number]]  (Abnormal) Collected:  07/16/20 0500    Specimen:  Blood Updated:  07/16/20 0602     Immature Reticulocyte Fraction 22.9 %      Reticulocyte % 0.94 %      Reticulocyte Hgb 20.5 pg     Basic Metabolic Panel  [[id number]]  (Abnormal) Collected:  07/16/20 0500    Specimen:  Blood Updated:  07/16/20 0601     Glucose 92 mg/dL      BUN 7 mg/dL      Creatinine 0.69 mg/dL      Sodium 134 mmol/L      Potassium 4.0 mmol/L      Comment: Specimen hemolyzed.  Results may be affected.        Chloride 102 mmol/L      CO2 22.0 mmol/L      Calcium 9.7 mg/dL      eGFR  African Amer 108 mL/min/1.73      BUN/Creatinine Ratio 10.1     Anion Gap 10.0 mmol/L     Narrative:       GFR Normal >60  Chronic Kidney Disease <60  Kidney Failure <15      CBC & Differential [[id number]] Collected:  07/16/20 0500    Specimen:  Blood Updated:  07/16/20 0527    Narrative:       The following orders were created for panel order CBC & Differential.  Procedure                               Abnormality         Status                     ---------                               -----------         ------                     CBC Auto Differential[[id number]]        Abnormal            Final result                 Please view results for these tests on the individual orders.    CBC Auto Differential [[id number]]  (Abnormal) Collected:  07/16/20 0500    Specimen:  Blood Updated:  07/16/20 0527     WBC 13.29 10*3/mm3      RBC 4.12 10*6/mm3      Hemoglobin 8.3 g/dL      Hematocrit 28.0 %      MCV 68.0 fL      MCH 20.1 pg      MCHC 29.6 g/dL      RDW 20.8 %      RDW-SD 50.3 fl      MPV 8.6 fL      Platelets 528 10*3/mm3      Neutrophil % 73.1 %      Lymphocyte % 17.9 %      Monocyte % 7.8 %      Eosinophil % 0.5 %      Basophil % 0.3 %      Immature Grans % 0.4 %      Neutrophils, Absolute 9.71 10*3/mm3      Lymphocytes, Absolute 2.38 10*3/mm3      Monocytes, Absolute 1.04 10*3/mm3      Eosinophils, Absolute 0.07 10*3/mm3      Basophils, Absolute 0.04 10*3/mm3      Immature Grans, Absolute 0.05 10*3/mm3      nRBC 0.0 /100 WBC     Hgb. Frac. Profile [[id number]] Collected:  07/16/20 0500    Specimen:  Blood Updated:  07/16/20 0519    Immunoglobulin Free LT Chains Blood  [[id number]] Collected:  07/16/20 0500    Specimen:  Blood Updated:  07/16/20 0518    COVID PRE-OP / PRE-PROCEDURE SCREENING ORDER (NO ISOLATION) - Swab, Nasopharynx [[id number]] Collected:  07/15/20 1340    Specimen:  Swab from Nasopharynx Updated:  07/15/20 1900    Narrative:       The following orders were created for panel order COVID PRE-OP / PRE-PROCEDURE SCREENING ORDER (NO ISOLATION) - Swab, Nasopharynx.  Procedure                               Abnormality         Status                     ---------                               -----------         ------                     COVID-19, [facility] MAD IN-HOUS...[[id number]]  Normal              Final result                 Please view results for these tests on the individual orders.    COVID-19, [facility] M[facility] IN-HOUSE, NP SWAB IN TRANSPORT MEDIA 8-10 HR TAT - Swab, Nasopharynx [[id number]]  (Normal) Collected:  07/15/20 1340    Specimen:  Swab from Nasopharynx Updated:  07/15/20 1900     COVID19 Not Detected    Narrative:       Testing performed by Real Time RT-PCR  This test has not been approved by the [facility] but is authorized under the Emergency Use Act (EUA)    Fact sheet for providers: https://www.fda.gov/media/637003/download    Fact sheet for patients: https://www.fda.gov/media/431614/download               I reviewed the patient's new clinical results.  I reviewed the patient's new imaging results and agree with the interpretation.     ASSESSMENT/PLAN:   ASSESSMENT: Patient with anemia.  Patient has a questionable ovarian tumor.  Patient has not had a colonoscopy.  Patient underwent EGD without any evidence of bleeding    PLAN: #1 we will schedule patient for colonoscopy to evaluate for the possible source of anemia.  The risks, benefits, and alternatives of this procedure have been discussed with the patient or the responsible party- the patient understands and agrees to proceed.         Fidel Carbajal MD  07/16/20  13:47           This document has been  electronically signed by Fidel Carbajal MD on July 16, 2020 13:47

## 2020-07-16 NOTE — PROGRESS NOTES
[facility] Medicine Services  INPATIENT PROGRESS NOTE    Length of Stay: 0  Date of Admission: 7/15/2020  Primary Care Physician: Marlee Kilgore APRN    Subjective   Chief Complaint: Shortness of breath on exertion, abdominal pain.    HPI: Patient being managed for symptomatic anemia and 70 pound weight loss.  Upper GI endoscopy yesterday was unremarkable but CT scan of the abdomen showed suspected ovarian mass.    This morning patient states that she has slight abdominal pain in the periumbilical region but denies nausea or vomiting.    Review of Systems   Constitutional: Negative for activity change, appetite change, chills, fatigue and fever.   HENT: Negative for congestion, sore throat and trouble swallowing.    Respiratory: Negative for cough, chest tightness, shortness of breath and wheezing.    Cardiovascular: Negative for chest pain, palpitations and leg swelling.   Gastrointestinal: Positive for abdominal pain. Negative for abdominal distention, diarrhea, nausea and vomiting.   Genitourinary: Negative for difficulty urinating, dysuria and hematuria.   Musculoskeletal: Negative for arthralgias, back pain and myalgias.   Skin: Negative for pallor and rash.   Neurological: Negative for dizziness, syncope, weakness, light-headedness and headaches.   Hematological: Negative for adenopathy. Does not bruise/bleed easily.   Psychiatric/Behavioral: Negative for agitation and confusion. The patient is not nervous/anxious.      Objective    Temp:  [97.4 °F (36.3 °C)-100.6 °F (38.1 °C)] 97.8 °F (36.6 °C)  Heart Rate:  [] 91  Resp:  [16-18] 18  BP: (111-133)/(57-95) 132/78    Physical Exam   Constitutional: She is oriented to person, place, and time. She appears well-developed and well-nourished. No distress.   HENT:   Head: Normocephalic and atraumatic.   Mouth/Throat: Oropharynx is clear and moist. No oropharyngeal exudate.   Eyes: Pupils are equal, round, and reactive  to light. Conjunctivae and EOM are normal. No scleral icterus.   Neck: Normal range of motion. Neck supple. No JVD present. No tracheal deviation present. No thyromegaly present.   Cardiovascular: Normal rate, regular rhythm, normal heart sounds and intact distal pulses. Exam reveals no gallop and no friction rub.   No murmur heard.  Pulmonary/Chest: Effort normal and breath sounds normal. No stridor. No respiratory distress. She has no wheezes. She has no rales. She exhibits no tenderness.   Abdominal: Soft. Bowel sounds are normal. She exhibits no distension and no mass. There is no tenderness. There is no rebound and no guarding. No hernia.   Musculoskeletal: Normal range of motion. She exhibits no edema, tenderness or deformity.   Lymphadenopathy:     She has no cervical adenopathy.   Neurological: She is alert and oriented to person, place, and time. No cranial nerve deficit. She exhibits normal muscle tone.   Skin: Skin is warm and dry. Capillary refill takes less than 2 seconds. No rash noted. She is not diaphoretic. No erythema. No pallor.   Psychiatric: She has a normal mood and affect. Her behavior is normal. Judgment and thought content normal.     Medication Review:    Current Facility-Administered Medications:   •  acetaminophen (TYLENOL) tablet 650 mg, 650 mg, Oral, Q4H PRN, Fidel Carbajal MD, 650 mg at 07/15/20 1932  •  atorvastatin (LIPITOR) tablet 80 mg, 80 mg, Oral, Nightly, Fidel Carbajal MD  •  docusate sodium (COLACE) capsule 100 mg, 100 mg, Oral, BID PRN, Fidel Carbajal MD  •  losartan (COZAAR) tablet 50 mg, 50 mg, Oral, Nightly, Fidel Carbajal MD  •  Morphine (MSIR) tablet 15 mg, 15 mg, Oral, Q6H PRN, Maria Kimbrough MD, 15 mg at 07/16/20 1359  •  ondansetron (ZOFRAN) injection 4 mg, 4 mg, Intravenous, Q6H PRN, Fidel Carbajal MD  •  pantoprazole (PROTONIX) injection 40 mg, 40 mg, Intravenous, Q12H, Fidel Carbajal MD, 40 mg at 07/16/20 0832  •  polyethylene glycol  (GoLYTELY) solution 4,000 mL, 4,000 mL, Oral, Once, Fidel Carbajal MD  •  polyethylene glycol (MIRALAX) packet 17 g, 17 g, Oral, Daily, Fidel Carbajal MD, 17 g at 07/16/20 0833  •  [COMPLETED] Insert peripheral IV, , , Once **AND** sodium chloride 0.9 % flush 10 mL, 10 mL, Intravenous, PRN, Fidel Carbajal MD  •  sodium chloride 0.9 % flush 10 mL, 10 mL, Intravenous, Q12H, Fidel Carbajal MD, 10 mL at 07/15/20 2034  •  sodium chloride 0.9 % flush 10 mL, 10 mL, Intravenous, PRN, Fidel Carbajal MD  •  sodium chloride 0.9 % infusion, 75 mL/hr, Intravenous, Continuous, Fidel Carbajal MD, Last Rate: 75 mL/hr at 07/16/20 1437, 75 mL/hr at 07/16/20 1437  •  sodium chloride 0.9 % infusion, 30 mL/hr, Intravenous, Continuous PRN, Fidel Carbajal MD    I have reviewed the patient's current medications.     Results Review:  I have reviewed the labs, radiology results, and diagnostic studies.    Laboratory Data:   Results from last 7 days   Lab Units 07/16/20  0500 07/15/20  0735   SODIUM mmol/L 134* 133*   POTASSIUM mmol/L 4.0 3.8   CHLORIDE mmol/L 102 98   CO2 mmol/L 22.0 24.0   BUN mg/dL 7 10   CREATININE mg/dL 0.69 0.72   GLUCOSE mg/dL 92 99   CALCIUM mg/dL 9.7 10.4   ANION GAP mmol/L 10.0 11.0     Estimated Creatinine Clearance: 102.4 mL/min (by C-G formula based on SCr of 0.69 mg/dL).          Results from last 7 days   Lab Units 07/16/20  0500 07/15/20  0735   WBC 10*3/mm3 13.29* 11.71*   HEMOGLOBIN g/dL 8.3* 6.4*   HEMATOCRIT % 28.0* 23.0*   PLATELETS 10*3/mm3 528* 592*     Results from last 7 days   Lab Units 07/15/20  0735   INR  1.14       Culture Data:   No results found for: BLOODCX  No results found for: URINECX  No results found for: RESPCX  No results found for: WOUNDCX  No results found for: STOOLCX  No components found for: BODYFLD    Radiology Data:   Imaging Results (Last 24 Hours)     Procedure Component Value Units Date/Time    XR Chest 1 View [[id number]] Collected:  07/16/20 0949     Updated:   07/16/20 1046    Narrative:       [facility] [place]    Patient Name: TERESE VELASQUEZ    ORDERING: SAYDA RUIZ     ATTENDING: SAYDA RUIZ     REFERRING: SAYDA RUIZ    -----------------------    PROCEDURE: Chest Single View    TECHNIQUE: Single AP view of the chest    COMPARISON: None    HISTORY: Suspected pneumonia, D64.9 Anemia, unspecified D64.9  Anemia, unspecified    FINDINGS:     Life-support devices: None.    Lungs/pleura: Asymmetrical airspace opacity within the left lower  lobe suspect for pulmonary infiltrate. No associated effusion. No  pneumothorax. The right lung is clear..    Heart, hilar and mediastinal structures: The heart size and  mediastinal contours are within limits of normal. The trachea is  midline.    Skeletal Structures: No acute findings. No free air beneath the  diaphragm.      Impression:       Patchy appearing infiltrate within the left lower lobe. No  associated acute pleural finding.    Electronically signed by:  De Alonso MD  7/16/2020 10:44 AM  CDT Workstation: [id number]    CT Abdomen Pelvis With Contrast [[id number]] Collected:  07/15/20 2105     Updated:  07/15/20 2146    Narrative:       CT abdomen and pelvis with contrast on  7/15/2020     CLINICAL INDICATION: Abnormal weight loss, generalized abdominal  pain    TECHNIQUE: Multiple axial images are obtained throughout the  abdomen and pelvis following the administration of IV contrast,  90 mL of Isovue-300 contrast was administered intravenously  without complication. This exam was performed according to our  departmental dose-optimization program, which includes automated  exposure control, adjustment of the mA and/or kV according to  patient size and/or use of iterative reconstruction technique.  Total DLP is 428.2 mGy*cm.    COMPARISON:  None    FINDINGS:  Abdomen: There is evidence of calcified granulomatous disease in  the lung bases. There is airspace opacity in the left lower  lobe  consistent with early pneumonia. There is an indeterminate 7 mm  noncalcified nodule in the right lower lobe on image two. The  solid abdominal organs are unremarkable. There is retroperitoneal  adenopathy that is confluent. For example confluent  retroperitoneal adenopathy on axial image 53 measures in greatest  dimensions approximately 4.4 x 2.5 cm. The retroperitoneal  adenopathy is predominant left para-aortic. There is no free  fluid or free air within the abdomen. The abdominal portion of  the GI tract is unremarkable.    Pelvis: There is a heterogeneous partly solid and partly cystic  pelvic mass in the midline anterior pelvis consistent with a  primary ovarian malignancy. This is favored to be left ovarian  although appears to be in contact with the right ovary as well.  Recommend follow-up pelvic ultrasound and gynecologic  consultation. This ovarian mass at one level measures  approximately 12.1 x 7.8 x 10.2 cm. There is either an exophytic  extension in the left external iliac region versus more likely  metastatic lymph node on axial image 86 measuring 2.6 x 1.7 cm.  There is adenopathy just below the aortic bifurcation and  extending along the bilateral common iliac chain. No other pelvic  adenopathy is noted. Small amount of free fluid is noted in the  pelvis. There is some peritoneal soft tissue density in the right  anterior peritoneum seen best on axial image 58 raising question  of early peritoneal implants. No other evidence of definite  peritoneal metastatic disease is noted. Pelvic portion of the GI  tract is unremarkable. Degenerative changes are noted in the  spine.      Impression:       1. 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 Recommend  gynecology consultation and appropriate follow-up. Dr. Kimbrough  was made aware of this finding and recommendation  by myself by  phone on 7/15/2020 at 9:44 PM central time.  2. Left lower lobe pneumonia.  3. Indeterminate 7 mm nodule in the right lower lobe that could  represent granuloma or metastatic disease and recommend  appropriate follow-up.    Electronically signed by:  Kaden Chew  7/15/2020 9:45 PM  CDT Workstation: [id number]          Assessment/Plan     Hospital Problem List:  Principal Problem:    Symptomatic anemia  Active Problems:    Iron deficiency anemia due to chronic blood loss    Neuropathy  Essential hypertension  Ovarian mass on imaging    Plan  - Patient received 2 units of PRBC with appropriate rise in hemoglobin.  Gastroenterology input appreciated.  Patient is planned for colonoscopy in the morning  -Clear liquid diet  -Hematology oncology input appreciated. Patient has an ovarian mass on imaging with evidence of metastatic adenopathy in the abdomen and pelvis. Possible early peritoneal metastatic disease was also noted.  Will reevaluate patient after colonoscopy to determine source of primary tumor. Further treatment recommendations to follow.  -Continue medication for essential hypertension  -DVT prophylaxis with SCDs  -CODE STATUS is full code    Discharge Planning: In progress    Kareem Zavala MD   07/16/20   15:17

## 2020-07-16 NOTE — NURSING NOTE
When completing second unit of PRBCs, patient's temp was 100.6. Called Dr. Palacio to make aware of patient's temp. Pt has no shortness of breath or itching at this time. MD stated we will not consider this a reaction. Will continue to monitor pt.

## 2020-07-16 NOTE — PLAN OF CARE
"  Problem: Patient Care Overview  Goal: Plan of Care Review  Outcome: Ongoing (interventions implemented as appropriate)  Flowsheets  Taken 7/16/2020 0105  Progress: no change  Outcome Summary: Pt states \"I'm feeling a little better\". Temp elevated at 100.6 after blood transfusion (See Note). Will continue to monitor.  Taken 7/15/2020 1932  Plan of Care Reviewed With: patient     "

## 2020-07-16 NOTE — PROGRESS NOTES
Kinsey Martinez  [id number]  1965    Subjective     Kinsey Martinez was seen in follow up for anemia, abdominal pain and weight loss.   I reviewed the result of CT abdomen/pelvis with patient.   This unfortunately showed  large ovarian mass with metastatic adenopathy in abdomen and pelvis with possible early peritoneal disease.  I have shared my concern with patient that I am very suspicious for neoplastic process.  Differentials include primary ovarian cancer with metastatic retroperitoneal adenopathy versus colon cancer with metastasis into ovarys and retroperitoneal lymph node.  I will check CEA and .   Case was discussed with Dr Nunez - gynecology.   She does have family history of colon cancer in her sister at a young age.  Recommend proceeding with colonoscopy tomorrow.  Discussed that patient might need referral to gynecology oncology if colonoscopy comes back normal.  Her hemoglobin has improved after 2 units of blood and dose of IV iron yesterday.  We will plan to repeat IV iron infusion today.  We discussed the pain management at length.  She reportedly allergic to oxycodone and tramadol.  She has tolerated morphine well in the past.  I will start her on 15 mg morphine every 4-6 hours as needed for pain.  Counseled about opioid-induced constipation and management at length.      Past Medical History, Past Surgical History, Social History, Family History have been reviewed and are without significant changes except as mentioned.    Review of Systems         CONSTITUTIONAL: fatigue + weakness + weight loss +  No  fever, chills.  HEENT: Eyes: No visual loss, blurred vision, double vision or yellow sclerae. Ears, Nose, Throat: No hearing loss, sneezing, congestion, runny nose or sore throat.  SKIN: No rash or itching.  CARDIOVASCULAR: No chest pain, chest pressure or chest discomfort. No palpitations or edema.  RESPIRATORY: No shortness of breath, cough or sputum.  GASTROINTESTINAL: abdominal  pain + No anorexia, nausea, vomiting or diarrhea. No blood.  GENITOURINARY: Urinary frequency + Negative for urgency or dysuria.   NEUROLOGICAL: No headache, dizziness, syncope, paralysis, ataxia, numbness or tingling in the extremities. No change in bowel or bladder control.  MUSCULOSKELETAL: Chronic joint pain + .  HEMATOLOGIC: anemia + No bleeding or bruising.  LYMPHATICS: No enlarged nodes. No history of splenectomy.  PSYCHIATRIC: No history of depression or anxiety.  ENDOCRINOLOGIC: No reports of sweating, cold or heat intolerance. No polyuria or polydipsia.  ALLERGIES: No history of asthma, hives, eczema or rhinitis.      Medications:  The current medication list was reviewed in the EMR    ALLERGIES:    Allergies   Allergen Reactions   • Aspirin Unknown - High Severity   • Codeine Unknown - High Severity   • Tramadol Unknown - High Severity       Objective      Vitals:    07/16/20 0630 07/16/20 0719 07/16/20 1143 07/16/20 1514   BP:  114/59 128/74 132/78   BP Location:  Left arm Left arm Left arm   Patient Position:  Lying Lying Lying   Pulse:  85 97 91   Resp:  18 18 18   Temp:  97.5 °F (36.4 °C) 97.8 °F (36.6 °C) 97.8 °F (36.6 °C)   TempSrc:  Temporal Temporal Temporal   SpO2:  100% 99% 98%   Weight: 83 kg (183 lb)      Height:         No flowsheet data found.    Physical Exam      GENERAL: Alert, awake, oriented.    Not in apparent distress. Vitals as above.   HEAD: Normocephalic, atraumatic.   EYES: PERRL, EOMI.  vision is grossly intact.  Conjunctival pallor +   NECK: Supple, no adenopathy or thyromegaly.   THROAT: Normal oral cavity and pharynx. No inflammation, swelling, exudate, or lesions.  CARDIAC: Normal S1 and S2. No S3, S4 or murmurs. Rhythm is regular.  Extremities are warm and well perfused.   LUNGS: Clear to auscultation and percussion without rales, rhonchi, wheezing or diminished breath sounds.  ABDOMEN: Positive bowel sounds. Soft, non-distended.  Tenderness in bilateral lower quadrants.  no  guarding or rebound. No masses.  BACK:  No bony tenderness.   EXTREMITIES: No significant deformity or joint abnormality.  Peripheral pulses intact. No varicosities.  SKIN: No rash or bruising.  NEUROLOGICAL: Grossly non-focal exam. No focal weakness. Gait: Normal.   PSYCH: Mood and affect normal. No hallucination or suicidal thoughts.   LYMPHATIC: No cervical, supraclavicular or axillary adenopathy.       RECENT LABS: Independently reviewed and summarized  Hematology WBC   Date Value Ref Range Status   07/16/2020 13.29 (H) 3.40 - 10.80 10*3/mm3 Final     RBC   Date Value Ref Range Status   07/16/2020 4.12 3.77 - 5.28 10*6/mm3 Final     Hemoglobin   Date Value Ref Range Status   07/16/2020 8.3 (L) 12.0 - 15.9 g/dL Final     Hematocrit   Date Value Ref Range Status   07/16/2020 28.0 (L) 34.0 - 46.6 % Final     Platelets   Date Value Ref Range Status   07/16/2020 528 (H) 140 - 450 10*3/mm3 Final          Imaging (independently reviewed and summarized):   Result of CT abdomen pelvis from July 15, 2020 reviewed.  Showed large ovarian mass with metastatic adenopathy in abdomen and pelvis with possible early peritoneal disease.      Diagnosis:   (1) Ovarian mass with metastatic retroperitoneal adenopathy   (2) Anemia   (3) Thrombocytosis  (4) Neuropathy   (5) Cancer associated pain   (6) Unintentional weight loss     (1) is new issue for me. In addition, it is potentially life threatening.     Assessment/Plan       (1) Ovarian mass with metastatic retroperitoneal adenopathy     Result of CT scan of abdomen pelvis discussed with patient.  I have shared my concern with patient that I am very suspicious for neoplastic process.  Differentials include primary ovarian cancer with metastatic retroperitoneal adenopathy versus colon cancer with metastasis into ovarys and retroperitoneal lymph node.  I will check CEA and .   Recommend colonoscopy tomorrow to rule out primary colon cancer.  Case was discussed with Dr Nunez -  gynecology.   She does have family history of colon cancer in her sister at a young age.  If no evidence of colon cancer, she will need referral to gynecology oncology at [facility].   She will also need CT chest to complete staging work up.   Discussed with patient that we need to do further evaluation before making any definite treatment plan.  Patient obviously quite upset and shocked by the news.  Due to patient request I also call her daughter and spent at least 20 minutes discussing case with her on the phone.      (2) Anemia   Suspect occult GI bleeding.  Upper endoscopy unremarkable.  Recommend colonoscopy tomorrow.  She received 2 unit of packed red blood cell transfusion on July 15, 2020.  She also received IV iron Ferrlecit 125 mg on July 15.  She tolerated IV iron well.  We will repeat IV iron infusion today.      (3) Thrombocytosis:  Likely this is secondary to iron deficiency.  She does not have any prior history of arterial venous thrombosis.  No palpable hepatosplenomegaly.  We will monitor platelet count closely and if no improvement after IV iron replacement I will consider MPN testing.    (4) Neuropathy: Unexplained. Could be paraneoplastic syndrome. SPEP, immunofixation and FLC.     (5) Cancer associated pain: She was started on morphine immediate release 15 mg every 6 hours as needed for severe pain.    (6) Unintentional weight loss: Likely secondary to metastatic cancer.  Recommend high-calorie high-protein diet.      Case was discussed with Dr Flanagan (radiology), Dr Nunez (gynecology) and Dr Zavala (primary team).     Discussed with patient's daughter on phone - 20 minutes. This was in addition to face to face time spend with patient.     Mervin Kimbrough MD       7/16/2020      CC:

## 2020-07-16 NOTE — PLAN OF CARE
Patients vitals are stable. Patient has lower abdominal tenderness but states no pain at this time. Patient has no further questions at this time. Will continue to monitor.    Problem: Patient Care Overview  Goal: Plan of Care Review  Outcome: Ongoing (interventions implemented as appropriate)

## 2020-07-16 NOTE — H&P (VIEW-ONLY)
SUBJECTIVE:   2020  Chief Complaint:     Subjective      Patient is 54 y.o. female with symptomatic anemia.  EGD did not show any source of bleeding.  Patient has not had a recent colonoscopy.    History:  Past Medical History:   Diagnosis Date   • Anemia    • GERD (gastroesophageal reflux disease)    • Hypertension      Past Surgical History:   Procedure Laterality Date   • CARDIAC CATHETERIZATION     •  SECTION     • COLONOSCOPY     • NERVE REPAIR     • POLYPECTOMY       Family History   Problem Relation Age of Onset   • Heart disease Mother         CHF   • Diabetes Mother    • Stroke Father    • Cancer Sister      Social History     Tobacco Use   • Smoking status: Never Smoker   • Smokeless tobacco: Never Used   Substance Use Topics   • Alcohol use: Yes     Frequency: Monthly or less     Comment: occassionally   • Drug use: Defer     Medications Prior to Admission   Medication Sig Dispense Refill Last Dose   • atorvastatin (LIPITOR) 80 MG tablet Take 80 mg by mouth Every Night.   7/15/2020 at Unknown time   • pantoprazole (Protonix) 40 MG EC tablet Take 1 tablet by mouth Daily. 30 tablet 2 7/15/2020 at Unknown time   • irbesartan (AVAPRO) 150 MG tablet Take 300 mg by mouth Every Night.   Taking   • polyethylene glycol (MIRALAX) 17 g packet Take 17g PO BID PRN constipation 60 packet 1      Allergies:  Aspirin; Codeine; and Tramadol     CURRENT MEDICATIONS/OBJECTIVE/VS/PE:     Current Medications:     Current Facility-Administered Medications   Medication Dose Route Frequency Provider Last Rate Last Dose   • acetaminophen (TYLENOL) tablet 650 mg  650 mg Oral Q4H PRN Fidel Carbajal MD   650 mg at 07/15/20 1932   • atorvastatin (LIPITOR) tablet 80 mg  80 mg Oral Nightly Fidel Carbajal MD       • docusate sodium (COLACE) capsule 100 mg  100 mg Oral BID PRN Fidel Carbajal MD       • losartan (COZAAR) tablet 50 mg  50 mg Oral Nightly Fidel Carbajal MD       • Morphine (MSIR) tablet 15 mg  15  mg Oral Q6H PRN Maria Kimbrough MD       • ondansetron (ZOFRAN) injection 4 mg  4 mg Intravenous Q6H PRN Fidel Carbajal MD       • pantoprazole (PROTONIX) injection 40 mg  40 mg Intravenous Q12H Fidel Carbajal MD   40 mg at 07/16/20 0832   • polyethylene glycol (GoLYTELY) solution 4,000 mL  4,000 mL Oral Once Fidel Carbajal MD       • polyethylene glycol (MIRALAX) packet 17 g  17 g Oral Daily Fidel Carbajal MD   17 g at 07/16/20 0833   • sodium chloride 0.9 % flush 10 mL  10 mL Intravenous PRN Fidel Carbajal MD       • sodium chloride 0.9 % flush 10 mL  10 mL Intravenous Q12H Fidel Carbajal MD   10 mL at 07/15/20 2034   • sodium chloride 0.9 % flush 10 mL  10 mL Intravenous PRN Fidel Carbajal MD       • sodium chloride 0.9 % infusion  75 mL/hr Intravenous Continuous Fidel Carbajal MD 75 mL/hr at 07/16/20 1146 75 mL/hr at 07/16/20 1146   • sodium chloride 0.9 % infusion  30 mL/hr Intravenous Continuous PRN Fidel Carbajal MD           Objective     Review of Systems:   Review of Systems    Physical Exam:   Temp:  [97 °F (36.1 °C)-100.6 °F (38.1 °C)] 97.8 °F (36.6 °C)  Heart Rate:  [] 97  Resp:  [16-18] 18  BP: (111-144)/(57-95) 128/74     Physical Exam:  General Appearance:    Alert, cooperative, in no acute distress   Head:    Normocephalic, without obvious abnormality, atraumatic   Eyes:            Lids and lashes normal, conjunctivae and sclerae normal, no   icterus, no pallor, corneas clear, PERRLA   Ears:    Ears appear intact with no abnormalities noted   Throat:   No oral lesions, no thrush, oral mucosa moist   Neck:   No adenopathy, supple, trachea midline, no thyromegaly, no     carotid bruit, no JVD   Back:     No kyphosis present, no scoliosis present, no skin lesions,       erythema or scars, no tenderness to percussion or                   palpation,   range of motion normal   Lungs:     Clear to auscultation,respirations regular, even and                   unlabored     Heart:    Regular rhythm and normal rate, normal S1 and S2, no            murmur, no gallop, no rub, no click   Breast Exam:    Deferred   Abdomen:     Normal bowel sounds, no masses, no organomegaly, soft        non-tender, non-distended, no guarding, no rebound                 tenderness   Genitalia:    Deferred   Extremities:   Moves all extremities well, no edema, no cyanosis, no              redness   Pulses:   Pulses palpable and equal bilaterally   Skin:   No bleeding, bruising or rash   Lymph nodes:   No palpable adenopathy   Neurologic:   Cranial nerves 2 - 12 grossly intact, sensation intact, DTR        present and equal bilaterally      Results Review:     Lab Results (last 24 hours)     Procedure Component Value Units Date/Time    Folate [[id number]]  (Normal) Collected:  07/16/20 0500    Specimen:  Blood Updated:  07/16/20 1211     Folate 9.07 ng/mL     Narrative:       Results may be falsely increased if patient taking Biotin.      Vitamin B12 [[id number]]  (Normal) Collected:  07/16/20 0500    Specimen:  Blood Updated:  07/16/20 1211     Vitamin B-12 590 pg/mL     Narrative:       Results may be falsely increased if patient taking Biotin.      CEA [[id number]] Collected:  07/16/20 0950    Specimen:  Blood Updated:  07/16/20 0955    Protein Elec + Interp, Serum [[id number]] Collected:  07/16/20 0927    Specimen:  Blood Updated:  07/16/20 0927    Immunofixation, Serum [[id number]] Collected:  07/16/20 0900    Specimen:  Blood Updated:  07/16/20 0926     [[id number]] Collected:  07/16/20 0500    Specimen:  Blood Updated:  07/16/20 0917    Tissue Pathology Exam [[id number]] Collected:  07/15/20 1607    Specimen:  Tissue from Gastric, Antrum Updated:  07/16/20 0726    Retic With IRF & RET-He [[id number]]  (Abnormal) Collected:  07/16/20 0500    Specimen:  Blood Updated:  07/16/20 0602     Immature Reticulocyte Fraction 22.9 %      Reticulocyte % 0.94 %      Reticulocyte Hgb 20.5 pg     Basic Metabolic Panel  [[id number]]  (Abnormal) Collected:  07/16/20 0500    Specimen:  Blood Updated:  07/16/20 0601     Glucose 92 mg/dL      BUN 7 mg/dL      Creatinine 0.69 mg/dL      Sodium 134 mmol/L      Potassium 4.0 mmol/L      Comment: Specimen hemolyzed.  Results may be affected.        Chloride 102 mmol/L      CO2 22.0 mmol/L      Calcium 9.7 mg/dL      eGFR  African Amer 108 mL/min/1.73      BUN/Creatinine Ratio 10.1     Anion Gap 10.0 mmol/L     Narrative:       GFR Normal >60  Chronic Kidney Disease <60  Kidney Failure <15      CBC & Differential [[id number]] Collected:  07/16/20 0500    Specimen:  Blood Updated:  07/16/20 0527    Narrative:       The following orders were created for panel order CBC & Differential.  Procedure                               Abnormality         Status                     ---------                               -----------         ------                     CBC Auto Differential[[id number]]        Abnormal            Final result                 Please view results for these tests on the individual orders.    CBC Auto Differential [[id number]]  (Abnormal) Collected:  07/16/20 0500    Specimen:  Blood Updated:  07/16/20 0527     WBC 13.29 10*3/mm3      RBC 4.12 10*6/mm3      Hemoglobin 8.3 g/dL      Hematocrit 28.0 %      MCV 68.0 fL      MCH 20.1 pg      MCHC 29.6 g/dL      RDW 20.8 %      RDW-SD 50.3 fl      MPV 8.6 fL      Platelets 528 10*3/mm3      Neutrophil % 73.1 %      Lymphocyte % 17.9 %      Monocyte % 7.8 %      Eosinophil % 0.5 %      Basophil % 0.3 %      Immature Grans % 0.4 %      Neutrophils, Absolute 9.71 10*3/mm3      Lymphocytes, Absolute 2.38 10*3/mm3      Monocytes, Absolute 1.04 10*3/mm3      Eosinophils, Absolute 0.07 10*3/mm3      Basophils, Absolute 0.04 10*3/mm3      Immature Grans, Absolute 0.05 10*3/mm3      nRBC 0.0 /100 WBC     Hgb. Frac. Profile [[id number]] Collected:  07/16/20 0500    Specimen:  Blood Updated:  07/16/20 0519    Immunoglobulin Free LT Chains Blood  [[id number]] Collected:  07/16/20 0500    Specimen:  Blood Updated:  07/16/20 0518    COVID PRE-OP / PRE-PROCEDURE SCREENING ORDER (NO ISOLATION) - Swab, Nasopharynx [[id number]] Collected:  07/15/20 1340    Specimen:  Swab from Nasopharynx Updated:  07/15/20 1900    Narrative:       The following orders were created for panel order COVID PRE-OP / PRE-PROCEDURE SCREENING ORDER (NO ISOLATION) - Swab, Nasopharynx.  Procedure                               Abnormality         Status                     ---------                               -----------         ------                     COVID-19, [facility] IN-HOUS...[[id number]]  Normal              Final result                 Please view results for these tests on the individual orders.    COVID-19, [facility] IN-HOUSE, NP SWAB IN TRANSPORT MEDIA 8-10 HR TAT - Swab, Nasopharynx [[id number]]  (Normal) Collected:  07/15/20 1340    Specimen:  Swab from Nasopharynx Updated:  07/15/20 1900     COVID19 Not Detected    Narrative:       Testing performed by Real Time RT-PCR  This test has not been approved by the [facility] but is authorized under the Emergency Use Act (EUA)    Fact sheet for providers: https://www.fda.gov/media/018113/download    Fact sheet for patients: https://www.fda.gov/media/895822/download               I reviewed the patient's new clinical results.  I reviewed the patient's new imaging results and agree with the interpretation.     ASSESSMENT/PLAN:   ASSESSMENT: Patient with anemia.  Patient has a questionable ovarian tumor.  Patient has not had a colonoscopy.  Patient underwent EGD without any evidence of bleeding    PLAN: #1 we will schedule patient for colonoscopy to evaluate for the possible source of anemia.  The risks, benefits, and alternatives of this procedure have been discussed with the patient or the responsible party- the patient understands and agrees to proceed.         Fidel Carbajal MD  07/16/20  13:47           This document has been  electronically signed by Fidel Carbajal MD on July 16, 2020 13:47

## 2020-07-17 ENCOUNTER — ANESTHESIA (OUTPATIENT)
Dept: GASTROENTEROLOGY | Facility: HOSPITAL | Age: 55
End: 2020-07-17

## 2020-07-17 ENCOUNTER — ANESTHESIA EVENT (OUTPATIENT)
Dept: GASTROENTEROLOGY | Facility: HOSPITAL | Age: 55
End: 2020-07-17

## 2020-07-17 ENCOUNTER — APPOINTMENT (OUTPATIENT)
Dept: CT IMAGING | Facility: HOSPITAL | Age: 55
End: 2020-07-17

## 2020-07-17 LAB
ALBUMIN SERPL-MCNC: 2.4 G/DL (ref 2.9–4.4)
ALBUMIN/GLOB SERPL: 0.5 {RATIO} (ref 0.7–1.7)
ALPHA1 GLOB FLD ELPH-MCNC: 0.5 G/DL (ref 0–0.4)
ALPHA2 GLOB SERPL ELPH-MCNC: 1.4 G/DL (ref 0.4–1)
ANION GAP SERPL CALCULATED.3IONS-SCNC: 12 MMOL/L (ref 5–15)
B-GLOBULIN SERPL ELPH-MCNC: 1.2 G/DL (ref 0.7–1.3)
BASOPHILS # BLD AUTO: 0.05 10*3/MM3 (ref 0–0.2)
BASOPHILS NFR BLD AUTO: 0.4 % (ref 0–1.5)
BH BB BLOOD EXPIRATION DATE: NORMAL
BH BB BLOOD EXPIRATION DATE: NORMAL
BH BB BLOOD TYPE BARCODE: NORMAL
BH BB BLOOD TYPE BARCODE: NORMAL
BH BB DISPENSE STATUS: NORMAL
BH BB DISPENSE STATUS: NORMAL
BH BB PRODUCT CODE: NORMAL
BH BB PRODUCT CODE: NORMAL
BH BB UNIT NUMBER: NORMAL
BH BB UNIT NUMBER: NORMAL
BUN SERPL-MCNC: 4 MG/DL (ref 6–20)
BUN/CREAT SERPL: 6.7 (ref 7–25)
CALCIUM SPEC-SCNC: 9.7 MG/DL (ref 8.6–10.5)
CEA SERPL-MCNC: <0.6 NG/ML
CHLORIDE SERPL-SCNC: 101 MMOL/L (ref 98–107)
CO2 SERPL-SCNC: 23 MMOL/L (ref 22–29)
CREAT SERPL-MCNC: 0.6 MG/DL (ref 0.57–1)
CROSSMATCH INTERPRETATION: NORMAL
CROSSMATCH INTERPRETATION: NORMAL
DEPRECATED RDW RBC AUTO: 50 FL (ref 37–54)
EOSINOPHIL # BLD AUTO: 0.15 10*3/MM3 (ref 0–0.4)
EOSINOPHIL NFR BLD AUTO: 1.2 % (ref 0.3–6.2)
ERYTHROCYTE [DISTWIDTH] IN BLOOD BY AUTOMATED COUNT: 21.1 % (ref 12.3–15.4)
GAMMA GLOB SERPL ELPH-MCNC: 2.2 G/DL (ref 0.4–1.8)
GFR SERPL CREATININE-BSD FRML MDRD: 127 ML/MIN/1.73
GLOBULIN SER CALC-MCNC: 5.3 G/DL (ref 2.2–3.9)
GLUCOSE SERPL-MCNC: 87 MG/DL (ref 65–99)
HCT VFR BLD AUTO: 27.5 % (ref 34–46.6)
HGB BLD-MCNC: 8.1 G/DL (ref 12–15.9)
IMM GRANULOCYTES # BLD AUTO: 0.06 10*3/MM3 (ref 0–0.05)
IMM GRANULOCYTES NFR BLD AUTO: 0.5 % (ref 0–0.5)
KAPPA LC SERPL-MCNC: 59.1 MG/L (ref 3.3–19.4)
KAPPA LC/LAMBDA SER: 1.27 {RATIO} (ref 0.26–1.65)
LAB AP CASE REPORT: NORMAL
LAMBDA LC FREE SERPL-MCNC: 46.5 MG/L (ref 5.7–26.3)
LYMPHOCYTES # BLD AUTO: 2.14 10*3/MM3 (ref 0.7–3.1)
LYMPHOCYTES NFR BLD AUTO: 17.4 % (ref 19.6–45.3)
Lab: ABNORMAL
M-SPIKE: ABNORMAL G/DL
MCH RBC QN AUTO: 19.8 PG (ref 26.6–33)
MCHC RBC AUTO-ENTMCNC: 29.5 G/DL (ref 31.5–35.7)
MCV RBC AUTO: 67.2 FL (ref 79–97)
MONOCYTES # BLD AUTO: 1.13 10*3/MM3 (ref 0.1–0.9)
MONOCYTES NFR BLD AUTO: 9.2 % (ref 5–12)
NEUTROPHILS NFR BLD AUTO: 71.3 % (ref 42.7–76)
NEUTROPHILS NFR BLD AUTO: 8.74 10*3/MM3 (ref 1.7–7)
NRBC BLD AUTO-RTO: 0 /100 WBC (ref 0–0.2)
PATH REPORT.FINAL DX SPEC: NORMAL
PLATELET # BLD AUTO: 504 10*3/MM3 (ref 140–450)
PMV BLD AUTO: 8.7 FL (ref 6–12)
POTASSIUM SERPL-SCNC: 4.1 MMOL/L (ref 3.5–5.2)
PROT PATTERN SERPL ELPH-IMP: ABNORMAL
PROT SERPL-MCNC: 7.7 G/DL (ref 6–8.5)
RBC # BLD AUTO: 4.09 10*6/MM3 (ref 3.77–5.28)
SODIUM SERPL-SCNC: 136 MMOL/L (ref 136–145)
UNIT  ABO: NORMAL
UNIT  ABO: NORMAL
UNIT  RH: NORMAL
UNIT  RH: NORMAL
WBC # BLD AUTO: 12.27 10*3/MM3 (ref 3.4–10.8)

## 2020-07-17 PROCEDURE — 85025 COMPLETE CBC W/AUTO DIFF WBC: CPT | Performed by: INTERNAL MEDICINE

## 2020-07-17 PROCEDURE — 71260 CT THORAX DX C+: CPT

## 2020-07-17 PROCEDURE — G0378 HOSPITAL OBSERVATION PER HR: HCPCS

## 2020-07-17 PROCEDURE — 45378 DIAGNOSTIC COLONOSCOPY: CPT | Performed by: INTERNAL MEDICINE

## 2020-07-17 PROCEDURE — 25010000002 IOPAMIDOL 61 % SOLUTION: Performed by: INTERNAL MEDICINE

## 2020-07-17 PROCEDURE — 25010000002 NA FERRIC GLUC CPLX PER 12.5 MG: Performed by: INTERNAL MEDICINE

## 2020-07-17 PROCEDURE — 99214 OFFICE O/P EST MOD 30 MIN: CPT | Performed by: OBSTETRICS & GYNECOLOGY

## 2020-07-17 PROCEDURE — 25010000002 PROPOFOL 10 MG/ML EMULSION: Performed by: NURSE ANESTHETIST, CERTIFIED REGISTERED

## 2020-07-17 PROCEDURE — 99225 PR SBSQ OBSERVATION CARE/DAY 25 MINUTES: CPT | Performed by: INTERNAL MEDICINE

## 2020-07-17 PROCEDURE — 80048 BASIC METABOLIC PNL TOTAL CA: CPT | Performed by: INTERNAL MEDICINE

## 2020-07-17 RX ORDER — LIDOCAINE HYDROCHLORIDE 20 MG/ML
INJECTION, SOLUTION EPIDURAL; INFILTRATION; INTRACAUDAL; PERINEURAL AS NEEDED
Status: DISCONTINUED | OUTPATIENT
Start: 2020-07-17 | End: 2020-07-17 | Stop reason: SURG

## 2020-07-17 RX ORDER — PROPOFOL 10 MG/ML
VIAL (ML) INTRAVENOUS AS NEEDED
Status: DISCONTINUED | OUTPATIENT
Start: 2020-07-17 | End: 2020-07-17 | Stop reason: SURG

## 2020-07-17 RX ADMIN — LIDOCAINE HYDROCHLORIDE 60 MG: 20 INJECTION, SOLUTION EPIDURAL; INFILTRATION; INTRACAUDAL; PERINEURAL at 15:24

## 2020-07-17 RX ADMIN — PANTOPRAZOLE SODIUM 40 MG: 40 INJECTION, POWDER, FOR SOLUTION INTRAVENOUS at 20:12

## 2020-07-17 RX ADMIN — PROPOFOL 20 MG: 10 INJECTION, EMULSION INTRAVENOUS at 15:26

## 2020-07-17 RX ADMIN — MORPHINE SULFATE 15 MG: 15 TABLET ORAL at 09:22

## 2020-07-17 RX ADMIN — ATORVASTATIN CALCIUM 80 MG: 40 TABLET, FILM COATED ORAL at 20:12

## 2020-07-17 RX ADMIN — PROPOFOL 100 MG: 10 INJECTION, EMULSION INTRAVENOUS at 15:24

## 2020-07-17 RX ADMIN — SODIUM CHLORIDE 125 MG: 9 INJECTION, SOLUTION INTRAVENOUS at 18:32

## 2020-07-17 RX ADMIN — PANTOPRAZOLE SODIUM 40 MG: 40 INJECTION, POWDER, FOR SOLUTION INTRAVENOUS at 09:22

## 2020-07-17 RX ADMIN — PROPOFOL 20 MG: 10 INJECTION, EMULSION INTRAVENOUS at 15:25

## 2020-07-17 RX ADMIN — SODIUM CHLORIDE, PRESERVATIVE FREE 10 ML: 5 INJECTION INTRAVENOUS at 20:12

## 2020-07-17 RX ADMIN — IOPAMIDOL 90 ML: 612 INJECTION, SOLUTION INTRAVENOUS at 18:45

## 2020-07-17 RX ADMIN — LOSARTAN POTASSIUM 50 MG: 50 TABLET, FILM COATED ORAL at 20:12

## 2020-07-17 RX ADMIN — PROPOFOL 20 MG: 10 INJECTION, EMULSION INTRAVENOUS at 15:27

## 2020-07-17 RX ADMIN — SODIUM CHLORIDE 75 ML/HR: 9 INJECTION, SOLUTION INTRAVENOUS at 17:19

## 2020-07-17 RX ADMIN — SODIUM CHLORIDE 75 ML/HR: 9 INJECTION, SOLUTION INTRAVENOUS at 02:54

## 2020-07-17 RX ADMIN — PROPOFOL 20 MG: 10 INJECTION, EMULSION INTRAVENOUS at 15:28

## 2020-07-17 NOTE — ANESTHESIA POSTPROCEDURE EVALUATION
Patient: Kinsey Martinez    Procedure Summary     Date:  07/17/20 Room / Location:  [facility] ENDOSCOPY 4 / [facility] ENDOSCOPY    Anesthesia Start:  1523 Anesthesia Stop:  1533    Procedure:  COLONOSCOPY (N/A ) Diagnosis:       Symptomatic anemia      (Symptomatic anemia [D64.9])    Surgeon:  Fidel Carbajal MD Provider:  Kristie Rinaldi CRNA    Anesthesia Type:  MAC ASA Status:  3 - Emergent          Anesthesia Type: MAC    Vitals  No vitals data found for the desired time range.          Post Anesthesia Care and Evaluation    Patient location during evaluation: bedside  Patient participation: waiting for patient participation  Level of consciousness: sleepy but conscious  Pain score: 0  Pain management: adequate  Airway patency: patent  Anesthetic complications: No anesthetic complications  PONV Status: none  Cardiovascular status: acceptable  Respiratory status: acceptable  Hydration status: acceptable

## 2020-07-17 NOTE — PROGRESS NOTES
Kinsey Martinez  [id number]  1965    Subjective     Kinsey Martinez was seen in follow up for anemia and ovarian mass.   Reviewed the result of colonoscopy which showed no evidence of colon cancer.  Her CEA is normal.   elevated at 522.8.   This is consistent with diagnosis of ovarian cancer.  Her hemoglobin has improved but she still anemic.  She has tolerated 2 doses of IV iron well.  I will repeat another dose of IV iron today.    Past Medical History, Past Surgical History, Social History, Family History have been reviewed and are without significant changes except as mentioned.    Review of Systems         CONSTITUTIONAL: weakness + fatigue + weight loss + No fever, chills.  HEENT: Eyes: No visual loss, blurred vision, double vision or yellow sclerae. Ears, Nose, Throat: No hearing loss, sneezing, congestion, runny nose or sore throat.  SKIN: No rash or itching.  CARDIOVASCULAR: No chest pain, chest pressure or chest discomfort. No palpitations or edema.  RESPIRATORY: No shortness of breath, cough or sputum.  GASTROINTESTINAL: abdominal pain +  No anorexia, nausea, vomiting or diarrhea. No  blood.  GENITOURINARY: Negative for urgency, frequency or dysuria.   NEUROLOGICAL: No headache, dizziness, syncope, paralysis, ataxia, numbness or tingling in the extremities. No change in bowel or bladder control.  MUSCULOSKELETAL: chronic joint pain + .  HEMATOLOGIC: anemia + No bleeding or bruising.  LYMPHATICS: No enlarged nodes. No history of splenectomy.  PSYCHIATRIC: No history of depression or anxiety.  ENDOCRINOLOGIC: No reports of sweating, cold or heat intolerance. No polyuria or polydipsia.  ALLERGIES: No history of asthma, hives, eczema or rhinitis.      Medications:  The current medication list was reviewed in the EMR    ALLERGIES:    Allergies   Allergen Reactions   • Aspirin Anaphylaxis and Angioedema   • Codeine Itching   • Oxycodone Itching   • Tramadol Itching       Objective      Vitals:     07/17/20 0755 07/17/20 1128 07/17/20 1455 07/17/20 1535   BP: 129/61 110/59 140/76 107/60   BP Location: Left arm Left arm     Patient Position: Lying Lying  Lying   Pulse: 90 90 84 80   Resp: 18 18 16 19   Temp: 98.8 °F (37.1 °C) 98.9 °F (37.2 °C) 98.1 °F (36.7 °C) 98.4 °F (36.9 °C)   TempSrc: Temporal Temporal  Temporal   SpO2: 99% 96% 99% 99%   Weight:       Height:         No flowsheet data found.    Physical Exam      GENERAL: Alert, awake, oriented.    Not in apparent distress. Vitals as above.   HEAD: Normocephalic, atraumatic.   EYES: PERRL, EOMI.  vision is grossly intact. Conjunctival pallor +   NECK: Supple, no adenopathy or thyromegaly.   THROAT: Normal oral cavity and pharynx. No inflammation, swelling, exudate, or lesions.  CARDIAC: Normal S1 and S2. No S3, S4 or murmurs. Rhythm is regular.  Extremities are warm and well perfused.   LUNGS: Clear to auscultation and percussion without rales, rhonchi, wheezing or diminished breath sounds.  ABDOMEN: Positive bowel sounds. Soft, nondistended, tenderness in lower quadrants +. No guarding or rebound. No masses.  BACK:  No bony tenderness.   EXTREMITIES: No significant deformity or joint abnormality.  Peripheral pulses intact. No varicosities.  SKIN: No rash or bruising.  NEUROLOGICAL: Grossly non-focal exam. No focal weakness.   PSYCH: Mood and affect normal. No hallucination or suicidal thoughts.   LYMPHATIC: No cervical, supraclavicular or axillary adenopathy.     RECENT LABS: Independently reviewed and summarized  Hematology WBC   Date Value Ref Range Status   07/17/2020 12.27 (H) 3.40 - 10.80 10*3/mm3 Final     RBC   Date Value Ref Range Status   07/17/2020 4.09 3.77 - 5.28 10*6/mm3 Final     Hemoglobin   Date Value Ref Range Status   07/17/2020 8.1 (L) 12.0 - 15.9 g/dL Final     Hematocrit   Date Value Ref Range Status   07/17/2020 27.5 (L) 34.0 - 46.6 % Final     Platelets   Date Value Ref Range Status   07/17/2020 504 (H) 140 - 450 10*3/mm3 Final           Result of colonoscopy from July 17, 2020 reviewed.  This showed no evidence of colon cancer.  Showed internal and external hemorrhoids    Diagnosis:   (1) Ovarian mass with metastatic retroperitoneal adenopathy   (2) Anemia   (3) Thrombocytosis  (4) Neuropathy   (5) Cancer associated pain   (6) Unintentional weight loss     Assessment/Plan      (1) Ovarian mass with metastatic retroperitoneal adenopathy     Most likely ovarian primary.  Ca 125 is elevated.  Colonoscopy did not show any evidence of colon cancer.  CEA is normal.  We will arrange for CT scan of chest to complete the staging work-up for ovarian cancer.  Dr. Nunez will arrange follow-up with Gyn Onc surgeon in [place].      (2) Anemia:   Persistent   Likely blood loss.   Status post 2 units of packed red blood cell transfusion on July 15.  She is also received 2 doses of IV iron.  Her anemia is persistent.  I will repeat IV iron infusion today.  Her folic acid and B12 was normal.  Hemoglobin electrophoresis is pending.      (3) Thrombocytosis:  Likely this is secondary to iron deficiency.  She does not have any prior history of arterial venous thrombosis.  No palpable hepatosplenomegaly.  We will monitor platelet count closely and if no improvement after IV iron replacement I will consider MPN testing.    (4) Neuropathy: Unexplained.  SPEP - no monoclonal protein.   Immunofixation and free light chains pending.    (5) Cancer associated pain: Stable on morphine 15 mg immediate release.  Recommend she continue stents on discharge.    (6) Unintentional weight loss: Recommend nutritional supplements with meals.  Likely secondary to metastatic cancer.      Discharge planning discussed with Dr. Zavala.  Okay to discharge from hematology oncology standpoint tomorrow.  Follow-up with oncology in 3 to 4 weeks.  Dr. Nunez will arrange follow-up with gyn onc surgeon in [place].     Mervin Kimbrough MD     7/17/2020      CC:

## 2020-07-17 NOTE — PLAN OF CARE
Problem: Patient Care Overview  Goal: Plan of Care Review  Outcome: Ongoing (interventions implemented as appropriate)  Flowsheets  Taken 7/17/2020 0048  Progress: no change  Outcome Summary: Pt has completed most of her go lytely prep. States she cannot finish the small amount left. States BMs are clear at this time.  Taken 7/16/2020 2015  Plan of Care Reviewed With: patient

## 2020-07-17 NOTE — PLAN OF CARE
Pt resting at this time.  PRN pain medicine given to control pain.  VSS and all needs met.  Will continue to monitor.   Problem: Patient Care Overview  Goal: Plan of Care Review  Outcome: Ongoing (interventions implemented as appropriate)

## 2020-07-17 NOTE — CONSULTS
[facility]  Consult - Gynecology    Name: Kinsey Martinez  MRN: [id number]  Location:   Date: 07/15/2020  CSN: [id number]      REQUESTING SERVICE: Hematology/Oncology  REASON FOR CONSULT: Pelvic mass    HPI  Kinsey Martinez is a 54 y.o.  admitted to the hospital secondary to anemia after presenting with worsening fatigue.    She states that she began to feel bad over the past 6 months.  She recently moved to [place] to help take care of her new grandson (her daughter is in the  at [facility]) from [place], [place].  She states that earlier this year prior to leaving, she had an EGD/colonoscopy that were normal per the patient . Records from the EGD done in 2019 shows nNormal duodenum; ulcers in the antrum; erosions and erythema in the antrum and stomach body compatible with erosive gastritis; normal mucosa in the whole esophagus; pathology- mild reactive gastropathy, unremarkable squamous mucosa.  She states that she had a CT scan that was normal per the patient.  She reports at that point, she did have blood in her stool.  She states that they told her that she was anemic from gastric ulcers and received blood transfusions then too.  She states that she kept telling her sister that she felt like something was wrong with her stomach.  She states that when she laid down, she could feel something hard in her stomach.  She states that she told providers in [place] about this but they told her that everything was okay.    While admitted to the hospital during this current admission, she has received 2u pRBCs and is being followed by Hematology/Oncology.  During work-up for her anemia, a CT scan was ordered that showed a large pelvic mass measuring 9 cm.    The patient reports that she has had some bloating and occasional decreased appetite.  Denies any postmenopausal bleeding.    OB HISTORY  OB History    Para Term  AB Living   3 2 2   1 2   SAB TAB Ectopic  Molar Multiple Live Births     1       2      # Outcome Date GA Lbr Umer/2nd Weight Sex Delivery Anes PTL Lv   3 Term      CS-Unspec   NAZARIO   2 TAB      TAB      1 Term      CS-Unspec   NAZARIO     GYN HISTORY  Menarche age 12  Menopause age 51  Denies history of abnormal pap smears  Denies history of STIs  S/p hysteroscopy, D&C, polypectomy     PAST MEDICAL HISTORY  Past Medical History:   Diagnosis Date   • Anemia    • GERD (gastroesophageal reflux disease)    • Hypertension      PAST SURGICAL HISTORY  Past Surgical History:   Procedure Laterality Date   • CARDIAC CATHETERIZATION     •  SECTION     •  SECTION     • COLONOSCOPY     • ENDOSCOPY N/A 7/15/2020    Procedure: ESOPHAGOGASTRODUODENOSCOPY;  Surgeon: Fidel Carbajal MD;  [facility]: [facility] ENDOSCOPY;  Service: Gastroenterology;  Laterality: N/A;   • HYSTEROSCOPY W/ POLYPECTOMY     • NERVE REPAIR       FAMILY HISTORY  Family History   Problem Relation Age of Onset   • Heart disease Mother         CHF   • Diabetes Mother    • Stroke Father    • Colon cancer Sister 35         age 60 (metastases)   • Lung cancer Sister 32        non-smoker;  3 months later   • Breast cancer Cousin    • Breast cancer Cousin    • Uterine cancer Neg Hx      SOCIAL HISTORY  Social History     Tobacco Use   • Smoking status: Never Smoker   • Smokeless tobacco: Never Used   Substance Use Topics   • Alcohol use: Yes     Frequency: Monthly or less     Comment: occassionally   • Drug use: Never     ALLERGIES  Allergies   Allergen Reactions   • Aspirin Anaphylaxis and Angioedema   • Codeine Itching   • Oxycodone Itching   • Tramadol Itching     MEDICATIONS  Prior to Admission medications    Medication Sig Start Date End Date Taking? Authorizing Provider   atorvastatin (LIPITOR) 80 MG tablet Take 80 mg by mouth Every Night.   Yes Provider, MD Romario   pantoprazole (Protonix) 40 MG EC tablet Take 1 tablet by mouth Daily. 20  Yes Marlee Kilgore APRN      irbesartan (AVAPRO) 150 MG tablet Take 300 mg by mouth Every Night.    Provider, MD Romario   polyethylene glycol (MIRALAX) 17 g packet Take 17g PO BID PRN constipation 7/14/20   Marlee Kilgore APRN     REVIEW OF SYSTEMS  Review of Systems - History obtained from the patient  General ROS: positive for  - fatigue and weight loss  negative for - chills, fever, hot flashes, malaise, night sweats, sleep disturbance or weight gain  Psychological ROS: negative for - anxiety, behavioral disorder, concentration difficulties, decreased libido, depression, disorientation, hallucinations, hostility, irritability, memory difficulties, mood swings, obsessive thoughts, physical abuse, sexual abuse, sleep disturbances or suicidal ideation  Ophthalmic ROS: negative for - blurry vision, decreased vision, double vision, dry eyes, excessive tearing, eye pain, itchy eyes, loss of vision, photophobia, scotomata, uses contacts or uses glasses  ENT ROS: negative for - epistaxis, headaches, hearing change, nasal congestion, nasal discharge, nasal polyps, oral lesions, sinus pain, sneezing, sore throat, tinnitus, vertigo, visual changes or vocal changes  Allergy and Immunology ROS: negative for - hives, insect bite sensitivity, itchy/watery eyes, latex, nasal congestion, postnasal drip or seasonal allergies  Hematological and Lymphatic ROS: positive for - bleeding problems, blood clots, blood transfusions, bruising, jaundice, night sweats, pallor and swollen lymph nodes  negative for - bleeding problems, blood clots, blood transfusions, bruising, jaundice, night sweats, pallor or swollen lymph nodes  Endocrine ROS: negative for - breast changes, galactorrhea, hair pattern changes, hot flashes, malaise/lethargy, mood swings, palpitations, polydipsia/polyuria, skin changes, temperature intolerance  Breast ROS: negative for - galactorrhea, new or changing breast lumps, nipple changes or nipple discharge  Respiratory ROS: negative for -  "cough, hemoptysis, orthopnea, pleuritic pain, shortness of breath, sputum changes, stridor, tachypnea or wheezing  Cardiovascular ROS: negative for - chest pain, dyspnea on exertion, edema, irregular heartbeat, loss of consciousness, murmur, orthopnea, palpitations, paroxysmal nocturnal dyspnea, rapid heart rate or shortness of breath  Gastrointestinal ROS: positive for - abdominal pain, appetite loss and nausea/vomiting  negative for - blood in stools, change in bowel habits, change in stools, constipation, diarrhea, gas/bloating, heartburn, hematemesis, melena, stool incontinence or swallowing difficulty/pain  Genito-Urinary ROS: positive for - pelvic pain  negative for - change in menstrual cycle, change in urinary stream, dysmenorrhea, dyspareunia, dysuria, erectile dysfunction, genital discharge, genital ulcers, hematuria, incontinence, irregular/heavy menses, nocturia, urinary frequency/urgency or vulvar/vaginal symptoms  Musculoskeletal ROS: negative for - gait disturbance, joint pain, joint stiffness, joint swelling, muscle pain, muscular weakness  Neurological ROS: no TIA or stroke symptoms  Dermatological ROS: negative    PHYSICAL EXAMINATION  /74 (BP Location: Left arm, Patient Position: Lying)   Pulse 83   Temp 97.9 °F (36.6 °C) (Oral)   Resp 18   Ht 167.6 cm (66\")   Wt 83 kg (183 lb)   SpO2 98%   BMI 29.54 kg/m²   Gen: NAD, AAO x3  CV: RRR, no m/r/g  Lungs: CTAB, no w/r/r  Abd: Soft, NTND, fixed pelvic mass palpated midline, approximately to 15 week size, somewhat tender with palpation  Pelvic exam: Deferred  RVE: Deferred    LABS  Labs reviewed as indicated below:   7/15/2020 07:35 7/16/2020 05:00   WBC 11.71 (H) 13.29 (H)   RBC 3.52 (L) 4.12   Hemoglobin 6.4 (C) 8.3 (L)   Hematocrit 23.0 (L) 28.0 (L)   RDW 20.1 (H) 20.8 (H)   MCV 65.3 (L) 68.0 (L)   MCH 18.2 (L) 20.1 (L)   MCHC 27.8 (L) 29.6 (L)   MPV 8.7 8.6   Platelets 592 (H) 528 (H)   RDW-SD 46.6 50.3      7/15/2020 07:35   Protime " 15.1   INR 1.14   PTT 35.4      7/15/2020 07:35 7/16/2020 05:00   Glucose 99 92   Sodium 133 (L) 134 (L)   Potassium 3.8 4.0   CO2 24.0 22.0   Chloride 98 102   Anion Gap 11.0 10.0   Creatinine 0.72 0.69   BUN 10 7   BUN/Creatinine Ratio 13.9 10.1   Calcium 10.4 9.7   eGFR African Am 103 108     HbA1c 5.9    CA-125 522.8  CEA pending    IMAGING  CT A/P w/ IV contrast only (7/15/2020):  Abdomen: There is evidence of calcified granulomatous disease in the lung bases.  There is airspace opacity in the left lower lobe consistent with early pneumonia.  There is an indeterminate 7 mm noncalcified nodule in the right lower lobe on image two.  The solid abdominal organs are unremarkable.  There is retroperitoneal adenopathy that is confluent.  For example confluent retroperitoneal adenopathy on axial image 53 measures in greatest dimensions approximately 4.4 x 2.5 cm.  The retroperitoneal adenopathy is predominant left para-aortic.  There is no free fluid or free air within the abdomen.  The abdominal portion of the GI tract is unremarkable.     Pelvis: There is a heterogeneous partly solid and partly cystic pelvic mass in the midline anterior pelvis consistent with a primary ovarian malignancy.  This is favored to be left ovarian although appears to be in contact with the right ovary as well.  Recommend follow-up pelvic ultrasound and gynecologic consultation.  This ovarian mass at one level measures approximately 12.1 x 7.8 x 10.2 cm.  There is either an exophytic extension in the left external iliac region versus more likely metastatic lymph node on axial image 86 measuring 2.6 x 1.7 cm.  There is adenopathy just below the aortic bifurcation and extending along the bilateral common iliac chain.  No other pelvic adenopathy is noted.  Small amount of free fluid is noted in the pelvis.  There is some peritoneal soft tissue density in the right anterior peritoneum seen best on axial image 58 raising question of early  peritoneal implants.  No other evidence of definite peritoneal metastatic disease is noted.  Pelvic portion of the GI tract is unremarkable.  Degenerative changes are noted in the spine.    CXR (2020):  Patchy appearing infiltrate within the left lower lobe.  No associated acute pleural finding.    IMPRESSION  Kinsey Martinez is a 54 y.o.  admitted with symptomatic anemia and found to have a large 12 cm pelvic mass suspicious for ovarian malignancy with CT findings concerning for metastasis as well as elevated CA-125.    RECOMMENDATIONS  - I discussed with the patient regarding CT findings and my concern for an ovarian cancer present.  This was discussed with her by Dr. Jeffries with Oncology but she is still very overwhelmed by this.  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  I reviewed that for the best success, she should be evaluated by Gyn-Oncology.  I recommended that she be seen by Dr. Vance Singer from [facility] who sees patients in [place], [place].  - I do not recommend biopsying the ovarian mass as the malignancy will seed her abdomen, if it hasn't already.  - Prior to that visit, I would recommend a CT chest to rule out for any mediastinal metastases.    Thank you for allowing me to participate in the care of this patient.  Please contact me with any questions or concerns.    This document has been electronically signed by Martha Nunez MD on 2020 20:26.    I spent 58 minutes reviewing the patient's chart including labs, imaging, prior records scanned to the chart, as well as examining the patient and counseling her regarding recommendations.

## 2020-07-17 NOTE — PROGRESS NOTES
"PROGRESS NOTE - Gynecology    Name: Kinsey Martinez  MRN: [id number]  Location: 306/1  Date: 7/17/2020  CSN: [id number]    HD #3 secondary to anemia  Diagnosed with large pelvic mass with elevated CA-125    SUBJECTIVE  Patient seen and examined.  Doing well.  NPO for colonoscopy later today.  Denies chest pain, shortness of breath, dizziness, lightheadedness, fevers, chills, or leg pain.    OBJECTIVE  /61 (BP Location: Left arm, Patient Position: Lying)   Pulse 90   Temp 98.8 °F (37.1 °C) (Temporal)   Resp 18   Ht 167.6 cm (66\")   Wt 83.8 kg (184 lb 12.8 oz)   SpO2 99%   BMI 29.83 kg/m²   Gen: NAD, AAO x3  CV: RRR  Lungs: CTAB  Abd: Soft, nontender, pelvic mass palpated approximately 15 cm that is fixed and non-mobile  Ext: No edema, +pedal pulses bilaterally.    Intake/Output Summary (Last 24 hours) at 7/17/2020 0833  Last data filed at 7/17/2020 0254  Gross per 24 hour   Intake 1950 ml   Output --   Net 1950 ml     LABS  Labs reviewed as indicated below.  CA-125: 522.8    Hgb: 6.4 > 2u pRBCS > 8.3 > 8.1 (7/17)    IMAGING  Imaging reviewed as indicated below.  CT A/P w/ IV contrast only (7/15/2020):  Abdomen: There is evidence of calcified granulomatous disease in the lung bases.  There is airspace opacity in the left lower lobe consistent with early pneumonia.  There is an indeterminate 7 mm noncalcified nodule in the right lower lobe on image two.  The solid abdominal organs are unremarkable.  There is retroperitoneal adenopathy that is confluent.  For example confluent retroperitoneal adenopathy on axial image 53 measures in greatest dimensions approximately 4.4 x 2.5 cm.  The retroperitoneal adenopathy is predominant left para-aortic.  There is no free fluid or free air within the abdomen.  The abdominal portion of the GI tract is unremarkable.     Pelvis: There is a heterogeneous partly solid and partly cystic pelvic mass in the midline anterior pelvis consistent with a primary ovarian " malignancy.  This is favored to be left ovarian although appears to be in contact with the right ovary as well.  Recommend follow-up pelvic ultrasound and gynecologic consultation.  This ovarian mass at one level measures approximately 12.1 x 7.8 x 10.2 cm.  There is either an exophytic extension in the left external iliac region versus more likely metastatic lymph node on axial image 86 measuring 2.6 x 1.7 cm.  There is adenopathy just below the aortic bifurcation and extending along the bilateral common iliac chain.  No other pelvic adenopathy is noted.  Small amount of free fluid is noted in the pelvis.  There is some peritoneal soft tissue density in the right anterior peritoneum seen best on axial image 58 raising question of early peritoneal implants.  No other evidence of definite peritoneal metastatic disease is noted.  Pelvic portion of the GI tract is unremarkable.  Degenerative changes are noted in the spine.     CXR (2020):  Patchy appearing infiltrate within the left lower lobe.  No associated acute pleural finding.    IMPRESSION  Kinsey Martinez is a 54 y.o. , HD #3 admitted secondary to symptomatic anemia, Hgb stabilized after 2u pRBCs.  Incidental pelvic mass found on imaging with findings concerning for malignancy including para-aortic lymphadenopathy and potential early peritoneal implants.  CA-125 markedly elevated.  - HTN  - Chronic anemia  - GERD    RECOMMENDATIONS  - I discussed CA-125 with the patient and her daughter who was on the phone.  I reviewed that the CEA was not back however the elevated CA-125 currently is concerning for ovarian malignancy.  I reviewed that upon discharge, I would currently recommend that she be seen and evaluated by Dr. Singer with [facility] (as he sees patients in [place] where she lives).  I will call his office on Monday morning to set up that appointment for her.  - Recommend CT chest to evaluate for mediastinal metastases.  - If  patient is overall feeling well today and/or over the weekend, she can be discharged from a gynecological perspective.    Will sign off at this time.  Please contact me with any questions.    This document has been electronically signed by Martha Nunez MD on July 17, 2020 08:33.

## 2020-07-17 NOTE — ANESTHESIA PREPROCEDURE EVALUATION
Anesthesia Evaluation     Patient summary reviewed and Nursing notes reviewed   NPO Solid Status: > 8 hours  NPO Liquid Status: > 8 hours           Airway   Mallampati: I  TM distance: >3 FB  Neck ROM: full  No difficulty expected  Dental - normal exam     Pulmonary - negative pulmonary ROS and normal exam   Cardiovascular - normal exam    (+) hypertension,       Neuro/Psych- negative ROS  GI/Hepatic/Renal/Endo    (+)  GERD,      Musculoskeletal (-) negative ROS    Abdominal  - normal exam   Substance History - negative use     OB/GYN negative ob/gyn ROS         Other                        Anesthesia Plan    ASA 3 - emergent     MAC     intravenous induction     Anesthetic plan, all risks, benefits, and alternatives have been provided, discussed and informed consent has been obtained with: patient.

## 2020-07-17 NOTE — CONSULTS
Adult Nutrition  Assessment    Patient Name:  Kinsey Martinez  YOB: 1965  MRN: [id number]  Admit Date:  7/15/2020    Assessment Date:  7/16/2020    Comments:  Pt receiving Clear Liquids.  Colonoscopy scheduled.  Pt reports fair appetite.  Indicates she get full quickly.  Indicates food gets hung in her throat.  May need speech eval later.  Pt reports 80 lb wt loss the ;ast 5 mo due to illness.  Willing to receive Boost Breeze.  Pt reports havign nausea/vomiting.  Protonix PRN Zofran prescribed.    Reason for Assessment     Row Name 07/16/20 1916          Reason for Assessment    Reason For Assessment  identified at risk by screening criteria     Identified At Risk by Screening Criteria  MST SCORE 2+;unintentional loss of 10 lbs or more in the past 2 mos             Labs/Tests/Procedures/Meds     Row Name 07/16/20 1916          Labs/Procedures/Meds    Lab Results Reviewed  reviewed        Medications    Pertinent Medications Reviewed  reviewed, pertinent           Estimated/Assessed Needs     Row Name 07/16/20 1916          Calculation Measurements    Weight Used For Calculations  63.4 kg (139 lb 12.4 oz)        Estimated/Assessed Needs    Additional Documentation  Calorie Requirements (Group);Sherwood-St. Jeor Equation (Group);Fluid Requirements (Group);Protein Requirements (Group)        Calorie Requirements    Estimated Calorie Requirement (kcal/day)  1632     Estimated Calorie Need Method  Sherwood-St Jeor        Sherwood-St. Jeor Equation    RMR (Sherwood-St. Jeor Equation)  1255.75        Protein Requirements    Weight Used For Protein Calculations  63.4 kg (139 lb 12.4 oz)     Est Protein Requirement Amount (gms/kg)  1.2 gm protein     Estimated Protein Requirements (gms/day)  76.08        Fluid Requirements    Estimated Fluid Requirements (mL/day)  1902     RDA Method (mL)  1902         Nutrition Prescription Ordered     Row Name 07/16/20 1918          Nutrition Prescription PO    Current PO Diet   Clear Liquid         Evaluation of Received Nutrient/Fluid Intake     Row Name 07/16/20 1918          PO Evaluation    Number of Meals  1     % PO Intake  75               Electronically signed by:  Sarita Ortiz RD  07/16/20 19:20

## 2020-07-18 ENCOUNTER — READMISSION MANAGEMENT (OUTPATIENT)
Dept: CALL CENTER | Facility: HOSPITAL | Age: 55
End: 2020-07-18

## 2020-07-18 VITALS
HEIGHT: 66 IN | DIASTOLIC BLOOD PRESSURE: 69 MMHG | RESPIRATION RATE: 18 BRPM | OXYGEN SATURATION: 98 % | WEIGHT: 184 LBS | HEART RATE: 89 BPM | BODY MASS INDEX: 29.57 KG/M2 | TEMPERATURE: 98.7 F | SYSTOLIC BLOOD PRESSURE: 145 MMHG

## 2020-07-18 PROBLEM — N83.8 OVARIAN MASS: Status: ACTIVE | Noted: 2020-07-18

## 2020-07-18 LAB
ANION GAP SERPL CALCULATED.3IONS-SCNC: 9 MMOL/L (ref 5–15)
BASOPHILS # BLD AUTO: 0.06 10*3/MM3 (ref 0–0.2)
BASOPHILS NFR BLD AUTO: 0.5 % (ref 0–1.5)
BUN SERPL-MCNC: 5 MG/DL (ref 6–20)
BUN/CREAT SERPL: 8.1 (ref 7–25)
CALCIUM SPEC-SCNC: 9.7 MG/DL (ref 8.6–10.5)
CHLORIDE SERPL-SCNC: 103 MMOL/L (ref 98–107)
CO2 SERPL-SCNC: 22 MMOL/L (ref 22–29)
CREAT SERPL-MCNC: 0.62 MG/DL (ref 0.57–1)
DEPRECATED RDW RBC AUTO: 50.9 FL (ref 37–54)
EOSINOPHIL # BLD AUTO: 0.12 10*3/MM3 (ref 0–0.4)
EOSINOPHIL NFR BLD AUTO: 0.9 % (ref 0.3–6.2)
ERYTHROCYTE [DISTWIDTH] IN BLOOD BY AUTOMATED COUNT: 21.5 % (ref 12.3–15.4)
GFR SERPL CREATININE-BSD FRML MDRD: 122 ML/MIN/1.73
GLUCOSE SERPL-MCNC: 89 MG/DL (ref 65–99)
HCT VFR BLD AUTO: 26.4 % (ref 34–46.6)
HGB BLD-MCNC: 7.9 G/DL (ref 12–15.9)
IMM GRANULOCYTES # BLD AUTO: 0.06 10*3/MM3 (ref 0–0.05)
IMM GRANULOCYTES NFR BLD AUTO: 0.5 % (ref 0–0.5)
LYMPHOCYTES # BLD AUTO: 2.2 10*3/MM3 (ref 0.7–3.1)
LYMPHOCYTES NFR BLD AUTO: 16.7 % (ref 19.6–45.3)
MCH RBC QN AUTO: 19.8 PG (ref 26.6–33)
MCHC RBC AUTO-ENTMCNC: 29.9 G/DL (ref 31.5–35.7)
MCV RBC AUTO: 66.3 FL (ref 79–97)
MONOCYTES # BLD AUTO: 1.11 10*3/MM3 (ref 0.1–0.9)
MONOCYTES NFR BLD AUTO: 8.4 % (ref 5–12)
NEUTROPHILS NFR BLD AUTO: 73 % (ref 42.7–76)
NEUTROPHILS NFR BLD AUTO: 9.65 10*3/MM3 (ref 1.7–7)
NRBC BLD AUTO-RTO: 0 /100 WBC (ref 0–0.2)
PLATELET # BLD AUTO: 429 10*3/MM3 (ref 140–450)
PMV BLD AUTO: 8.4 FL (ref 6–12)
POTASSIUM SERPL-SCNC: 3.7 MMOL/L (ref 3.5–5.2)
RBC # BLD AUTO: 3.98 10*6/MM3 (ref 3.77–5.28)
SODIUM SERPL-SCNC: 134 MMOL/L (ref 136–145)
WBC # BLD AUTO: 13.2 10*3/MM3 (ref 3.4–10.8)

## 2020-07-18 PROCEDURE — 99225 PR SBSQ OBSERVATION CARE/DAY 25 MINUTES: CPT | Performed by: INTERNAL MEDICINE

## 2020-07-18 PROCEDURE — 85025 COMPLETE CBC W/AUTO DIFF WBC: CPT | Performed by: INTERNAL MEDICINE

## 2020-07-18 PROCEDURE — 80048 BASIC METABOLIC PNL TOTAL CA: CPT | Performed by: INTERNAL MEDICINE

## 2020-07-18 PROCEDURE — G0378 HOSPITAL OBSERVATION PER HR: HCPCS

## 2020-07-18 RX ORDER — MORPHINE SULFATE 15 MG/1
15 TABLET ORAL EVERY 6 HOURS PRN
Qty: 12 TABLET | Refills: 0 | Status: SHIPPED | OUTPATIENT
Start: 2020-07-18 | End: 2020-07-22

## 2020-07-18 RX ORDER — FERROUS SULFATE 325(65) MG
325 TABLET ORAL
Qty: 30 TABLET | Refills: 0 | Status: SHIPPED | OUTPATIENT
Start: 2020-07-18

## 2020-07-18 RX ORDER — MULTIVIT WITH MINERALS/LUTEIN
1000 TABLET ORAL DAILY
Qty: 30 TABLET | Refills: 0 | Status: SHIPPED | OUTPATIENT
Start: 2020-07-18

## 2020-07-18 RX ORDER — ONDANSETRON 4 MG/1
4 TABLET, FILM COATED ORAL EVERY 8 HOURS PRN
Qty: 21 TABLET | Refills: 0 | Status: SHIPPED | OUTPATIENT
Start: 2020-07-18

## 2020-07-18 RX ADMIN — PANTOPRAZOLE SODIUM 40 MG: 40 INJECTION, POWDER, FOR SOLUTION INTRAVENOUS at 08:33

## 2020-07-18 RX ADMIN — SODIUM CHLORIDE, PRESERVATIVE FREE 10 ML: 5 INJECTION INTRAVENOUS at 08:33

## 2020-07-18 RX ADMIN — MORPHINE SULFATE 15 MG: 15 TABLET ORAL at 13:07

## 2020-07-18 RX ADMIN — POLYETHYLENE GLYCOL 3350 17 G: 17 POWDER, FOR SOLUTION ORAL at 08:38

## 2020-07-18 NOTE — PROGRESS NOTES
[facility]  INPATIENT PROGRESS NOTE    Length of Stay: 0  Date of Admission: 7/15/2020  Primary Care Physician: Marlee Kilgore APRN    Subjective   Chief Complaint: Shortness of breath on exertion, abdominal pain.    HPI: Patient being managed for symptomatic anemia and 70 pound weight loss.  Upper GI endoscopy yesterday was unremarkable but CT scan of the abdomen showed suspected ovarian mass.    Today patient states that she has slight abdominal pain in the periumbilical region but denies nausea or vomiting.  She had a colonoscopy that showed no lesion today.    Review of Systems   Constitutional: Negative for activity change, appetite change, chills, fatigue and fever.   HENT: Negative for congestion, sore throat and trouble swallowing.    Respiratory: Negative for cough, chest tightness, shortness of breath and wheezing.    Cardiovascular: Negative for chest pain, palpitations and leg swelling.   Gastrointestinal: Positive for abdominal pain. Negative for abdominal distention, diarrhea, nausea and vomiting.   Genitourinary: Negative for difficulty urinating, dysuria and hematuria.   Musculoskeletal: Negative for arthralgias, back pain and myalgias.   Skin: Negative for pallor and rash.   Neurological: Negative for dizziness, syncope, weakness, light-headedness and headaches.   Hematological: Negative for adenopathy. Does not bruise/bleed easily.   Psychiatric/Behavioral: Negative for agitation and confusion. The patient is not nervous/anxious.      Objective    Temp:  [96.9 °F (36.1 °C)-98.9 °F (37.2 °C)] 98.6 °F (37 °C)  Heart Rate:  [80-90] 88  Resp:  [16-19] 18  BP: (107-140)/(59-76) 127/64    Physical Exam   Constitutional: She is oriented to person, place, and time. She appears well-developed and well-nourished. No distress.   HENT:   Head: Normocephalic and atraumatic.   Mouth/Throat: Oropharynx is clear and moist. No oropharyngeal exudate.      Eyes: Pupils are equal, round, and reactive to light. Conjunctivae and EOM are normal. No scleral icterus.   Neck: Normal range of motion. Neck supple. No JVD present. No tracheal deviation present. No thyromegaly present.   Cardiovascular: Normal rate, regular rhythm, normal heart sounds and intact distal pulses. Exam reveals no gallop and no friction rub.   No murmur heard.  Pulmonary/Chest: Effort normal and breath sounds normal. No stridor. No respiratory distress. She has no wheezes. She has no rales. She exhibits no tenderness.   Abdominal: Soft. Bowel sounds are normal. She exhibits no distension and no mass. There is tenderness. There is no rebound and no guarding. No hernia.   Musculoskeletal: Normal range of motion. She exhibits no edema, tenderness or deformity.   Lymphadenopathy:     She has no cervical adenopathy.   Neurological: She is alert and oriented to person, place, and time. No cranial nerve deficit. She exhibits normal muscle tone.   Skin: Skin is warm and dry. Capillary refill takes less than 2 seconds. No rash noted. She is not diaphoretic. No erythema. No pallor.   Psychiatric: She has a normal mood and affect. Her behavior is normal. Judgment and thought content normal.     Medication Review:    Current Facility-Administered Medications:   •  acetaminophen (TYLENOL) tablet 650 mg, 650 mg, Oral, Q4H PRN, Fidel Carbajal MD, 650 mg at 07/15/20 1932  •  atorvastatin (LIPITOR) tablet 80 mg, 80 mg, Oral, Nightly, Fidel Carbajal MD, 80 mg at 07/16/20 2014  •  docusate sodium (COLACE) capsule 100 mg, 100 mg, Oral, BID PRN, Fidel Carbajal MD  •  losartan (COZAAR) tablet 50 mg, 50 mg, Oral, Nightly, Fidel Carbajal MD, 50 mg at 07/16/20 2014  •  Morphine (MSIR) tablet 15 mg, 15 mg, Oral, Q6H PRN, Maria Kimbrough MD, 15 mg at 07/17/20 0922  •  ondansetron (ZOFRAN) injection 4 mg, 4 mg, Intravenous, Q6H PRN, Fidel Carbajal MD  •  pantoprazole (PROTONIX) injection 40 mg, 40 mg,  Intravenous, Q12H, Fidel Carbajal MD, 40 mg at 07/17/20 0922  •  polyethylene glycol (MIRALAX) packet 17 g, 17 g, Oral, Daily, Fidel Carbajal MD, 17 g at 07/16/20 0833  •  [COMPLETED] Insert peripheral IV, , , Once **AND** sodium chloride 0.9 % flush 10 mL, 10 mL, Intravenous, PRN, Fidel Carbajal MD  •  sodium chloride 0.9 % flush 10 mL, 10 mL, Intravenous, Q12H, Fidel Carbajal MD, 10 mL at 07/16/20 2014  •  sodium chloride 0.9 % flush 10 mL, 10 mL, Intravenous, PRN, Fidel Carbajal MD  •  sodium chloride 0.9 % infusion, 75 mL/hr, Intravenous, Continuous, Fidel Carbajal MD, Last Rate: 75 mL/hr at 07/17/20 1719, 75 mL/hr at 07/17/20 1719  •  sodium chloride 0.9 % infusion, 30 mL/hr, Intravenous, Continuous PRN, Fidel Carbajal MD    I have reviewed the patient's current medications.     Results Review:  I have reviewed the labs, radiology results, and diagnostic studies.    Laboratory Data:   Results from last 7 days   Lab Units 07/17/20  0600 07/16/20  0500 07/15/20  0735   SODIUM mmol/L 136 134* 133*   POTASSIUM mmol/L 4.1 4.0 3.8   CHLORIDE mmol/L 101 102 98   CO2 mmol/L 23.0 22.0 24.0   BUN mg/dL 4* 7 10   CREATININE mg/dL 0.60 0.69 0.72   GLUCOSE mg/dL 87 92 99   CALCIUM mg/dL 9.7 9.7 10.4   ANION GAP mmol/L 12.0 10.0 11.0     Estimated Creatinine Clearance: 118.3 mL/min (by C-G formula based on SCr of 0.6 mg/dL).          Results from last 7 days   Lab Units 07/17/20  0600 07/16/20  0500 07/15/20  0735   WBC 10*3/mm3 12.27* 13.29* 11.71*   HEMOGLOBIN g/dL 8.1* 8.3* 6.4*   HEMATOCRIT % 27.5* 28.0* 23.0*   PLATELETS 10*3/mm3 504* 528* 592*     Results from last 7 days   Lab Units 07/15/20  0735   INR  1.14       Culture Data:   No results found for: BLOODCX  No results found for: URINECX  No results found for: RESPCX  No results found for: WOUNDCX  No results found for: STOOLCX  No components found for: BODYFLD    Radiology Data:   Imaging Results (Last 24 Hours)     Procedure Component Value Units  Date/Time    CT Chest With Contrast [[id number]] Collected:  07/17/20 1750     Updated:  07/17/20 1832    Narrative:       PROCEDURE: CT CHEST W CONTRAST    INDICATION: Staging of ovarian malignancy    COMPARISON: Chest x-ray dated 7/15/2020, and CT of abdomen and  pelvis dated 7/15/2020     TECHNIQUE:    - reconstructions: Axial, coronal, sagittal    - contrast:  oral:  None                       intravenous: 90 mL of Isovue-300    This exam was performed according to our departmental  dose-optimization program, which includes automated exposure  control, adjustment of the mA and/or kV according to patient size  and/or use of iterative reconstruction technique. Next on DLP is  246.8    FINDINGS:    PULMONARY PARENCHYMA:      - air spaces: Negative    - interstitium: Grossly within normal limits for age    - misc: 0.7 cm right posterior medial lower lobe nodule,  indeterminate, unchanged from prior study. There is also a 0.5 cm  in greatest dimension pulmonary nodule in the superior segment of  the right lower lobe Interval improvement in patchy left lower  lobe airspace opacity, with some residual left lower lobe  airspace and groundglass opacification.     MEDIASTINUM / LATASHA:      - heart: Normal size, no pericardial fluid    - aorta/great vessels: Normal caliber and configuration for age    - misc: Calcified lymph nodes are present in the right hilum.     PLEURAL COMPARTMENT:      - misc: No pleural fluid or mass        MISC:      - inferior neck: Negative    - osseous/body wall: Negative    - subdiaphramatic: There is subtle low attenuation centrally  within the liver, concerning for possible metastatic disease.  This is best seen adjacent to the bifurcation of the intrahepatic  portion of the portal vein. Retroperitoneal lymphadenopathy is  incompletely visualized      Impression:       1. There are two solid nodules one measuring 0.71 measuring 0.5  cm in greatest dimension in the right lower lobe. These  are  indeterminate but metastatic disease is not excluded. There are  two small for sampling or reliable characterization by PET CT.  They can be followed up as deemed clinically warranted in this  patient with personal history of ovarian malignancy  2. Interval improvement in right lower lobe airspace opacity.  This should be followed up to complete radiographic resolution  3. Subtle low-attenuation lesion in the liver centrally, for  which metastasis cannot be excluded. Other etiologies including  hemangioma are possible however, and this could be followed up  and further characterized with multiphasic contrast enhanced CT  or MRI or as deemed clinically warranted.  4. Retroperitoneal lymphadenopathy is incompletely visualized.  This is likely not significantly changed from the prior study of  abdomen and pelvis dated 7/15/2020    Electronically signed by:  Ann Nation MD  7/17/2020 6:31 PM CDT  Workstation: [id number]          Assessment/Plan     Hospital Problem List:  Principal Problem:    Symptomatic anemia  Active Problems:    Iron deficiency anemia due to chronic blood loss    Neuropathy  Essential hypertension  Ovarian mass on imaging concerning for metastatic ovarian cancer    Plan  - Patient received 2 units of PRBC with appropriate rise in hemoglobin.  Hemoglobin is stable at 8.1G/DL this morning.  -Gastroenterology input appreciated.  Upper GI endoscopy and colonoscopy unrevealing for source of GI bleed.  -Hematology oncology input appreciated.  Continue iron therapy for iron deficiency anemia.  Patient has ovarian mass suspicious for ovarian cancer and will be referred to gynecologic oncology specialist on discharge.  -Monitor patient overnight and if hemoglobin and vital signs remained stable will discharge with pain medications in the morning  -Regular diet  -Continue medication for essential hypertension  -DVT prophylaxis with SCDs  -CODE STATUS is full code    Discharge Planning: Expect patient to  be discharged in the next 24 hours    Kareem Zavala MD   07/17/20   19:49

## 2020-07-18 NOTE — DISCHARGE SUMMARY
[facility] Medicine Services  DISCHARGE SUMMARY       Date of Admission: 7/15/2020  Date of Discharge:  7/18/2020  Primary Care Physician: Marlee Kilgore APRN    Presenting Problem/History of Present Illness:  Anemia, unspecified type [D64.9]       Final Discharge Diagnoses:  Active Hospital Problems    Diagnosis   • **Symptomatic anemia   • Ovarian mass   • Iron deficiency anemia due to chronic blood loss   • Neuropathy       Consults:   Consults     Date and Time Order Name Status Description    7/16/2020 1047 Inpatient Obstetrics / Gynecology Consult Completed     7/15/2020 1147 Inpatient Gastroenterology Consult Completed     7/15/2020 1147 Hematology & Oncology Inpatient Consult Completed           Procedures Performed: Procedure(s):  COLONOSCOPY           07/17 1433 COLONOSCOPY    Pertinent Test Results:   Collected Updated Procedure Result Status    07/18/2020 0313 07/18/2020 0347 Basic Metabolic Panel [[id number]]    (Abnormal)   Blood    Final result Component Value Units   Glucose 89 mg/dL   BUN 5Low  mg/dL   Creatinine 0.62 mg/dL   Sodium 134Low  mmol/L   Potassium 3.7  mmol/L   Chloride 103 mmol/L   CO2 22.0 mmol/L   Calcium 9.7 mg/dL   eGFR African Am 122 mL/min/1.73   BUN/Creatinine Ratio 8.1    Anion Gap 9.0 mmol/L           07/18/2020 0313 07/18/2020 0320 CBC Auto Differential [[id number]]   (Abnormal)   Blood    Final result Component Value Units   WBC 13.20High  10*3/mm3   RBC 3.98 10*6/mm3   Hemoglobin 7.9Low  g/dL   Hematocrit 26.4Low  %   MCV 66.3Low  fL   MCH 19.8Low  pg   MCHC 29.9Low  g/dL   RDW 21.5High  %   RDW-SD 50.9 fl   MPV 8.4 fL   Platelets 429 10*3/mm3   Neutrophil Rel % 73.0 %   Lymphocyte Rel % 16.7Low  %   Monocyte Rel % 8.4 %   Eosinophil Rel % 0.9 %   Basophil Rel % 0.5 %   Immature Granulocyte Rel % 0.5 %   Neutrophils Absolute 9.65High  10*3/mm3   Lymphocytes Absolute 2.20 10*3/mm3   Monocytes Absolute 1.11High  10*3/mm3   Eosinophils  Absolute 0.12 10*3/mm3   Basophils Absolute 0.06 10*3/mm3   Immature Grans, Absolute 0.06High  10*3/mm3   nRBC 0.0 /100 WBC             07/16/2020 0500 07/16/2020 1932  [[id number]]    (Abnormal)   Blood    Final result Component Value Units    522.8High  U/mL           07/15/2020 1340 07/15/2020 1900 COVID PRE-OP / PRE-PROCEDURE SCREENING ORDER (NO ISOLATION) - Swab, Nasopharynx [[id number]]    Swab from Nasopharynx    Final result Component Value   No component results              07/15/2020 1340 07/15/2020 1900 COVID-19, [facility] IN-HOUSE, NP SWAB IN TRANSPORT MEDIA 8-10 HR TAT - Swab, Nasopharynx [[id number]]    Swab from Nasopharynx    Final result Component Value   COVID19 Not Detected              07/15/2020 0917 07/15/2020 0932 ABO RH Specimen Verification [[id number]]   Blood    Final result Component Value   ABO Type O   RH type Positive           07/15/2020 0735 07/15/2020 0808 Basic Metabolic Panel [[id number]]    (Abnormal)   Blood    Final result Component Value Units   Glucose 99 mg/dL   BUN 10 mg/dL   Creatinine 0.72 mg/dL   Sodium 133Low  mmol/L   Potassium 3.8 mmol/L   Chloride 98 mmol/L   CO2 24.0 mmol/L   Calcium 10.4 mg/dL   eGFR African Am 103 mL/min/1.73   BUN/Creatinine Ratio 13.9    Anion Gap 11.0 mmol/L           07/15/2020 0735 07/15/2020 0800 Protime-INR [[id number]]    Blood    Final result Component Value Units   Protime 15.1 Seconds   INR 1.14            07/15/2020 0735 07/15/2020 0800 aPTT [[id number]]    Blood    Final result Component Value Units   PTT 35.4 seconds           07/15/2020 0735 07/15/2020 0810 CBC Auto Differential [[id number]]   (Abnormal)   Blood    Final result Component Value Units   WBC 11.71High  10*3/mm3   RBC 3.52Low  10*6/mm3   Hemoglobin 6.4Low Critical   g/dL   Hematocrit 23.0Low  %   MCV 65.3Low  fL   MCH 18.2Low  pg   MCHC 27.8Low  g/dL   RDW 20.1High  %   RDW-SD 46.6 fl   MPV 8.7 fL   Platelets 592High  10*3/mm3           07/15/2020 0735 07/15/2020 0824  Type & Screen [[id number]]   Blood    Edited Result - FINAL Component Value   ABO Type O   RH type Positive   Antibody Screen Negative   T&S Expiration Date 7/18/2020 11:59:59 PM           07/15/2020 0735 07/15/2020 0824 PREVIOUS HISTORY [[id number]]   Blood    Final result Component Value   Previous History No record on File           07/15/2020 0735 07/15/2020 0918 Manual Differential [[id number]]   (Abnormal)   Blood    Final result Component Value Units   Neutrophil Rel % 77.0High  %   Lymphocyte Rel % 16.0Low  %   Monocyte Rel % 7.0 %   Neutrophils Absolute 9.02High  10*3/mm3   Lymphocytes Absolute 1.87 10*3/mm3   Monocytes Absolute 0.82 10*3/mm3   Anisocytes Slight/1+    Hypochromia Mod/2+    Ovalocytes Slight/1+    Target Cells Slight/1+    WBC Morphology Normal    Platelet Estimate Increased            07/15/2020 0735 07/15/2020 1051 Iron Profile [[id number]]   (Abnormal)   Blood    Final result Component Value Units   Iron 19Low  mcg/dL   Iron Saturation 10Low  %   Transferrin 122Low  mg/dL   TIBC 182Low  mcg/dL           07/15/2020 0735 07/15/2020 1207 Ferritin [[id number]]    (Abnormal)   Blood    Final result Component Value Units   Ferritin 957.40High  ng/mL           07/15/2020 0735 07/15/2020 1217 Hemoglobin A1c [[id number]]    (Abnormal)   Blood    Final result Component Value Units   Hemoglobin A1C 5.90High  %             CT Chest With Contrast [[id number]] De as Reviewed   Order Status: Completed Collected: 07/17/20 1750    Updated: 07/17/20 1832   Narrative:     PROCEDURE: CT CHEST W CONTRAST    INDICATION: Staging of ovarian malignancy    COMPARISON: Chest x-ray dated 7/15/2020, and CT of abdomen and  pelvis dated 7/15/2020     TECHNIQUE:    - reconstructions: Axial, coronal, sagittal    - contrast:  oral:  None                       intravenous: 90 mL of Isovue-300    This exam was performed according to our departmental  dose-optimization program, which includes automated exposure  control,  adjustment of the mA and/or kV according to patient size  and/or use of iterative reconstruction technique. Next on DLP is  246.8    FINDINGS:    PULMONARY PARENCHYMA:      - air spaces: Negative    - interstitium: Grossly within normal limits for age    - misc: 0.7 cm right posterior medial lower lobe nodule,  indeterminate, unchanged from prior study. There is also a 0.5 cm  in greatest dimension pulmonary nodule in the superior segment of  the right lower lobe Interval improvement in patchy left lower  lobe airspace opacity, with some residual left lower lobe  airspace and groundglass opacification.     MEDIASTINUM / LATASHA:      - heart: Normal size, no pericardial fluid    - aorta/great vessels: Normal caliber and configuration for age    - misc: Calcified lymph nodes are present in the right hilum.     PLEURAL COMPARTMENT:      - misc: No pleural fluid or mass        MISC:      - inferior neck: Negative    - osseous/body wall: Negative    - subdiaphramatic: There is subtle low attenuation centrally  within the liver, concerning for possible metastatic disease.  This is best seen adjacent to the bifurcation of the intrahepatic  portion of the portal vein. Retroperitoneal lymphadenopathy is  incompletely visualized   Impression:     1. There are two solid nodules one measuring 0.71 measuring 0.5  cm in greatest dimension in the right lower lobe. These are  indeterminate but metastatic disease is not excluded. There are  two small for sampling or reliable characterization by PET CT.  They can be followed up as deemed clinically warranted in this  patient with personal history of ovarian malignancy  2. Interval improvement in right lower lobe airspace opacity.  This should be followed up to complete radiographic resolution  3. Subtle low-attenuation lesion in the liver centrally, for  which metastasis cannot be excluded. Other etiologies including  hemangioma are possible however, and this could be followed up  and  further characterized with multiphasic contrast enhanced CT  or MRI or as deemed clinically warranted.  4. Retroperitoneal lymphadenopathy is incompletely visualized.  This is likely not significantly changed from the prior study of  abdomen and pelvis dated 7/15/2020    Electronically signed by:  Ann Nation MD  7/17/2020 6:31 PM CDT  Workstation: [id number]   XR Chest 1 View [[id number]] De as Reviewed   Order Status: Completed Collected: 07/16/20 0949    Updated: 07/16/20 1046   Narrative:     [facility]    Patient Name: TERESE VELASQUEZ    ORDERING: SOTONTE E EBENIBO     ATTENDING: SAYDA RUIZ     REFERRING: SOROMULOTE SALBADOR RUIZ    -----------------------    PROCEDURE: Chest Single View    TECHNIQUE: Single AP view of the chest    COMPARISON: None    HISTORY: Suspected pneumonia, D64.9 Anemia, unspecified D64.9  Anemia, unspecified    FINDINGS:     Life-support devices: None.    Lungs/pleura: Asymmetrical airspace opacity within the left lower  lobe suspect for pulmonary infiltrate. No associated effusion. No  pneumothorax. The right lung is clear..    Heart, hilar and mediastinal structures: The heart size and  mediastinal contours are within limits of normal. The trachea is  midline.    Skeletal Structures: No acute findings. No free air beneath the  diaphragm.   Impression:     Patchy appearing infiltrate within the left lower lobe. No  associated acute pleural finding.    Electronically signed by:  De Alonso MD  7/16/2020 10:44 AM  CDT Workstation: [id number]   CT Abdomen Pelvis With Contrast [[id number]] De as Reviewed   Order Status: Completed Collected: 07/15/20 2105    Updated: 07/15/20 2146   Narrative:     CT abdomen and pelvis with contrast on  7/15/2020     CLINICAL INDICATION: Abnormal weight loss, generalized abdominal  pain    TECHNIQUE: Multiple axial images are obtained throughout the  abdomen and pelvis following the administration of IV contrast,  90 mL of  Isovue-300 contrast was administered intravenously  without complication. This exam was performed according to our  departmental dose-optimization program, which includes automated  exposure control, adjustment of the mA and/or kV according to  patient size and/or use of iterative reconstruction technique.  Total DLP is 428.2 mGy*cm.    COMPARISON:  None    FINDINGS:  Abdomen: There is evidence of calcified granulomatous disease in  the lung bases. There is airspace opacity in the left lower lobe  consistent with early pneumonia. There is an indeterminate 7 mm  noncalcified nodule in the right lower lobe on image two. The  solid abdominal organs are unremarkable. There is retroperitoneal  adenopathy that is confluent. For example confluent  retroperitoneal adenopathy on axial image 53 measures in greatest  dimensions approximately 4.4 x 2.5 cm. The retroperitoneal  adenopathy is predominant left para-aortic. There is no free  fluid or free air within the abdomen. The abdominal portion of  the GI tract is unremarkable.    Pelvis: There is a heterogeneous partly solid and partly cystic  pelvic mass in the midline anterior pelvis consistent with a  primary ovarian malignancy. This is favored to be left ovarian  although appears to be in contact with the right ovary as well.  Recommend follow-up pelvic ultrasound and gynecologic  consultation. This ovarian mass at one level measures  approximately 12.1 x 7.8 x 10.2 cm. There is either an exophytic  extension in the left external iliac region versus more likely  metastatic lymph node on axial image 86 measuring 2.6 x 1.7 cm.  There is adenopathy just below the aortic bifurcation and  extending along the bilateral common iliac chain. No other pelvic  adenopathy is noted. Small amount of free fluid is noted in the  pelvis. There is some peritoneal soft tissue density in the right  anterior peritoneum seen best on axial image 58 raising question  of early peritoneal  implants. No other evidence of definite  peritoneal metastatic disease is noted. Pelvic portion of the GI  tract is unremarkable. Degenerative changes are noted in the  spine.   Impression:     1. 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 Recommend  gynecology consultation and appropriate follow-up. Dr. Kimbrough  was made aware of this finding and recommendation by myself by  phone on 7/15/2020 at 9:44 PM central time.  2. Left lower lobe pneumonia.  3. Indeterminate 7 mm nodule in the right lower lobe that could  represent granuloma or metastatic disease and recommend  appropriate follow-up.    Electronically signed by:  Kaden Chew  7/15/2020 9:45 PM  CDT Workstation: [id number]       Chief Complaint on Day of Discharge: None    Hospital Course:  The patient is a 54 y.o. female with a past medical history of gastric and esophageal ulcers, chronic anemia status post transfusion, essential hypertension and left leg neuropathy.  She presents with complaints of worsening fatigue and shortness of breath on exertion and weight loss of 70 pounds over the past 6 months.  Patient had somewhat of a complaints of generalized abdominal pain and left leg nerve pain.  She was anemic on presentation with hemoglobin of 6.4G/DL.  She was reviewed by oncologist and gastroenterologist and managed for symptomatic anemia.  Upper GI endoscopy and colonoscopy did not show any gastric ulcers or any source of bleeding.  However patient had a CT scan which showed a left ovarian mass and a CA-125 was elevated.  It was thought that the patient had ovarian cancer and iron deficiency anemia.  She was given iron transfusions and given a referral to gynecologic oncology specialist at [facility] which is near her home for further management on discharge.    Condition on Discharge: Stable    Physical Exam on  "Discharge:  /69 (BP Location: Right arm, Patient Position: Lying)   Pulse 89   Temp 98.7 °F (37.1 °C) (Temporal)   Resp 18   Ht 167.6 cm (66\")   Wt 83.5 kg (184 lb)   SpO2 98%   BMI 29.70 kg/m²      Physical Exam  Constitutional: She is oriented to person, place, and time. She appears well-developed and well-nourished. No distress.   HENT:   Head: Normocephalic and atraumatic.   Mouth/Throat: Oropharynx is clear and moist. No oropharyngeal exudate.   Eyes: Pupils are equal, round, and reactive to light. Conjunctivae and EOM are normal. No scleral icterus.   Neck: Normal range of motion. Neck supple. No JVD present. No tracheal deviation present. No thyromegaly present.   Cardiovascular: Normal rate, regular rhythm, normal heart sounds and intact distal pulses. Exam reveals no gallop and no friction rub.   No murmur heard.  Pulmonary/Chest: Effort normal and breath sounds normal. No stridor. No respiratory distress. She has no wheezes. She has no rales. She exhibits no tenderness.   Abdominal: Soft. Bowel sounds are normal. She exhibits no distension and no mass. There is tenderness. There is no rebound and no guarding. No hernia.   Musculoskeletal: Normal range of motion. She exhibits no edema, tenderness or deformity.   Lymphadenopathy:     She has no cervical adenopathy.   Neurological: She is alert and oriented to person, place, and time. No cranial nerve deficit. She exhibits normal muscle tone.   Skin: Skin is warm and dry. Capillary refill takes less than 2 seconds. No rash noted. She is not diaphoretic. No erythema. No pallor.   Psychiatric: She has a normal mood and affect. Her behavior is normal. Judgment and thought content normal.     Discharge Disposition:  Home or Self Care    Discharge Medications:     Discharge Medications      New Medications      Instructions Start Date   ascorbic acid 1000 MG tablet  Commonly known as:  VITAMIN C   1,000 mg, Oral, Daily      ferrous sulfate 325 (65 FE) " MG tablet  Commonly known as:  FerrouSul   325 mg, Oral, Daily With Breakfast      Morphine 15 MG tablet  Commonly known as:  MSIR   15 mg, Oral, Every 6 Hours PRN      ondansetron 4 MG tablet  Commonly known as:  Zofran   4 mg, Oral, Every 8 Hours PRN         Continue These Medications      Instructions Start Date   atorvastatin 80 MG tablet  Commonly known as:  LIPITOR   80 mg, Oral, Nightly      irbesartan 150 MG tablet  Commonly known as:  AVAPRO   300 mg, Oral, Nightly      pantoprazole 40 MG EC tablet  Commonly known as:  Protonix   40 mg, Oral, Daily      polyethylene glycol 17 g packet  Commonly known as:  MIRALAX   Take 17g PO BID PRN constipation           Discharge Diet:   Diet Instructions     Diet: Regular      Discharge Diet:  Regular          Activity at Discharge:   Activity Instructions     Activity as Tolerated          Discharge Care Plan/Instructions:   1.  Follow-up with your primary care provider within 1 week of discharge   2.  You have been given referral by gynecology Dr. Nunez to see a gynecological oncologist specialist at [facility]. You should follow-up as soon as possible for your ovarian mass.   3.  Follow-up with hematologist oncologist Dr. Sifuentes within 1 month of discharge for iron deficiency anemia.    Follow-up Appointments:   Future Appointments   Date Time Provider Department Center   7/22/2020 11:15 AM Keisha Jean APRN [facility] None   8/17/2020  3:30 PM Marlee Kilgore APRN [facility] None       Test Results Pending at Discharge:    Order Current Status    Hgb. Frac. Profile In process    Immunofixation, Serum In process          Kareem Zavala MD  07/19/20  17:30    Time: 36 minutes of time was spent evaluating patient and planning discharge.      Part of this note may be an electronic transcription/translation of spoken language to printed text using the Dragon Dictation system.

## 2020-07-18 NOTE — DISCHARGE INSTR - LAB
Marlee Kilgore, APRN  1 Week  [phone]  Info was sent to the office. If they do not contact you within one business day with appointment info, please call.    Dr Sifuentes- Oncology  1 Month  [phone]  Info was sent to the office. If they do not contact you within one business day with appointment info, please call.

## 2020-07-18 NOTE — PROGRESS NOTES
Kinsey Martinez  [id number]  1965    Subjective     Kinsey Martinez was seen in follow-up for ovarian mass and anemia.  Result of CT scan of chest reviewed with patient.  Showed 2 very small pulmonary nodules.  Questionable liver mass on CT chest however contrasted CT chest abdomen did not show any definite evidence of liver metastasis.  Colonoscopy was normal.  Patient is feeling better after blood transfusion and IV iron.  She still have persistent anemia.  Denies any blood in the stool.  Discharge planning discussed with patient.  ROS as below.     Past Medical History, Past Surgical History, Social History, Family History have been reviewed and are without significant changes except as mentioned.    Review of Systems       CONSTITUTIONAL: fatigue + weakness + No weight loss, fever, chills.  HEENT: Eyes: No visual loss, blurred vision, double vision or yellow sclerae. Ears, Nose, Throat: No hearing loss, sneezing, congestion, runny nose or sore throat.  SKIN: No rash or itching.  CARDIOVASCULAR: No chest pain, chest pressure or chest discomfort. No palpitations or edema.  RESPIRATORY: No shortness of breath, cough or sputum.  GASTROINTESTINAL: No anorexia, nausea, vomiting or diarrhea. No abdominal pain or blood.  GENITOURINARY: Negative for urgency, frequency or dysuria.   NEUROLOGICAL: No headache, dizziness, syncope, paralysis, ataxia, numbness or tingling in the extremities. No change in bowel or bladder control.  MUSCULOSKELETAL: Chronic joint pain + .  HEMATOLOGIC: anemia + No bleeding or bruising.  LYMPHATICS: No enlarged nodes. No history of splenectomy.  PSYCHIATRIC: No history of depression or anxiety.  ENDOCRINOLOGIC: No reports of sweating, cold or heat intolerance. No polyuria or polydipsia.  ALLERGIES: No history of asthma, hives, eczema or rhinitis.      Medications:  The current medication list was reviewed in the EMR    ALLERGIES:    Allergies   Allergen Reactions   • Aspirin Anaphylaxis  and Angioedema   • Codeine Itching   • Oxycodone Itching   • Tramadol Itching       Objective      Vitals:    07/18/20 0310 07/18/20 0416 07/18/20 0732 07/18/20 1122   BP:  118/56 135/67 145/69   BP Location:  Right arm Right arm Right arm   Patient Position:  Lying Lying Lying   Pulse:  79 81 89   Resp:  18 18 18   Temp:  97.9 °F (36.6 °C) 98.1 °F (36.7 °C) 98.7 °F (37.1 °C)   TempSrc:  Temporal Temporal Temporal   SpO2:  99% 97% 98%   Weight: 83.5 kg (184 lb)      Height:         No flowsheet data found.    Physical Exam   Constitutional: She is oriented to person, place, and time. She appears well-developed. No distress.   HENT:   Head: Normocephalic and atraumatic.   Mouth/Throat: Oropharynx is clear and moist. No oropharyngeal exudate.   Eyes: Pupils are equal, round, and reactive to light. No scleral icterus.   Conjunctival pallor +    Neck: Normal range of motion. No JVD present.   Cardiovascular: Normal rate, regular rhythm and normal heart sounds.   No murmur heard.  Pulmonary/Chest: Effort normal and breath sounds normal. She has no wheezes. She has no rales.   Abdominal: Soft. There is tenderness. There is no rebound and no guarding.   Musculoskeletal: Normal range of motion. She exhibits no edema or deformity.   Neurological: She is alert and oriented to person, place, and time. No cranial nerve deficit or sensory deficit. She exhibits normal muscle tone.   Skin: Skin is warm. No rash noted. There is pallor.   Psychiatric: She has a normal mood and affect. Her behavior is normal. Thought content normal.             RECENT LABS: Independently reviewed and summarized  Hematology WBC   Date Value Ref Range Status   07/18/2020 13.20 (H) 3.40 - 10.80 10*3/mm3 Final     RBC   Date Value Ref Range Status   07/18/2020 3.98 3.77 - 5.28 10*6/mm3 Final     Hemoglobin   Date Value Ref Range Status   07/18/2020 7.9 (L) 12.0 - 15.9 g/dL Final     Hematocrit   Date Value Ref Range Status   07/18/2020 26.4 (L) 34.0 -  46.6 % Final     Platelets   Date Value Ref Range Status   07/18/2020 429 140 - 450 10*3/mm3 Final          Imaging (independently reviewed and summarized):   CT scan of chest with contrast from July 17, 2020 reviewed.  Showed 2 solid nodules both less than 1 cm in the right lower lobe.  No prior comparison available.      Diagnosis:   (1) Ovarian mass with metastatic retroperitoneal adenopathy   (2) Anemia   (3) Thrombocytosis  (4) Neuropathy   (5) Cancer associated pain   (6) Pulmonary nodules, right lower lobe - new issue     Assessment/Plan       (1) Ovarian mass with metastatic retroperitoneal adenopathy     Likely ovarian primary.  Dr. Nunez will arrange follow-up appointment with gyn onc surgeon in [place].   She was instructed to call us if she wants to follow-up with me here in [place].  Patient tells me that she lives in [place] and would be convenient to see  locally which is reasonable.  Discussed that that I would be happy to see her if she has any questions or concerns in the future.      (2) Anemia:   Persistent   Likely blood loss.   Status post 2 units of packed red blood cell transfusion on July 15.  She is also received 3 doses of IV iron.  She is feeling better.  She was encouraged to follow-up with her oncologist locally and consider IV iron to improve her anemia.    (3) Thrombocytosis : Resolved with treatment of IV iron.  Continue to monitor.    (4) Neuropathy   Etiology unclear but suspect paraneoplastic.  Immunofixation is pending however serum protein electrophoresis did not show any monoclonal protein.    (5) Cancer associated pain: Continue morphine on discharge as needed.    (6) Pulmonary nodules: 2 small less than 1 cm pulmonary nodules in the right lower lobe.  No prior imaging available for comparison.  PET scan unlikely to be useful given small size of nodule.  Sampling also would be extremely challenging.  Would resume this to be metastatic disease given her  ovarian cancer    Okay to discharge from hematology standpoint.  Patient will follow up with local oncologist in [place].  I have encouraged her to call us if she wants to see me in the future.    Mervin Kimbrough MD       7/18/2020      CC:

## 2020-07-19 NOTE — OUTREACH NOTE
Prep Survey      Responses   [facility] patient discharged from?  [place]   Is LACE score < 7 ?  Yes   Eligibility  [facility]   Date of Admission  07/15/20   Date of Discharge  07/18/20   Discharge Disposition  Home or Self Care   Discharge diagnosis  symptomatic anemia   COVID-19 Test Status  Negative   Does the patient have one of the following disease processes/diagnoses(primary or secondary)?  Other   Does the patient have Home health ordered?  No   Is there a DME ordered?  No   Prep survey completed?  Yes          Ida Henning RN

## 2020-07-20 ENCOUNTER — TRANSITIONAL CARE MANAGEMENT TELEPHONE ENCOUNTER (OUTPATIENT)
Dept: CALL CENTER | Facility: HOSPITAL | Age: 55
End: 2020-07-20

## 2020-07-20 PROBLEM — R53.83 FATIGUE: Status: ACTIVE | Noted: 2020-07-20

## 2020-07-20 PROBLEM — Z86.2 HISTORY OF ANEMIA: Status: ACTIVE | Noted: 2020-07-20

## 2020-07-20 PROBLEM — R13.10 DYSPHAGIA: Status: ACTIVE | Noted: 2020-07-20

## 2020-07-20 LAB
IGA SERPL-MCNC: 555 MG/DL (ref 87–352)
IGG SERPL-MCNC: 2188 MG/DL (ref 586–1602)
IGM SERPL-MCNC: 162 MG/DL (ref 26–217)
PROT PATTERN SERPL IFE-IMP: ABNORMAL

## 2020-07-20 NOTE — OUTREACH NOTE
Call Center TCM Note      Responses   [facility] patient discharged from?  [place]   COVID-19 Test Status  Negative   Does the patient have one of the following disease processes/diagnoses(primary or secondary)?  Other   TCM attempt successful?  No   Unsuccessful attempts  Attempt 1          Nichole Cordoba LPN    7/20/2020, 19:04

## 2020-07-21 ENCOUNTER — TRANSITIONAL CARE MANAGEMENT TELEPHONE ENCOUNTER (OUTPATIENT)
Dept: CALL CENTER | Facility: HOSPITAL | Age: 55
End: 2020-07-21

## 2020-07-21 NOTE — OUTREACH NOTE
Call Center TCM Note      Responses   [facility] patient discharged from?  [place]   COVID-19 Test Status  Negative   Does the patient have one of the following disease processes/diagnoses(primary or secondary)?  Other   TCM attempt successful?  No   Unsuccessful attempts  Attempt 3          Henna Boland RN    7/21/2020, 11:01

## 2020-07-22 ENCOUNTER — OFFICE VISIT (OUTPATIENT)
Dept: GASTROENTEROLOGY | Facility: CLINIC | Age: 55
End: 2020-07-22

## 2020-07-22 VITALS
HEIGHT: 66 IN | SYSTOLIC BLOOD PRESSURE: 149 MMHG | HEART RATE: 100 BPM | DIASTOLIC BLOOD PRESSURE: 91 MMHG | BODY MASS INDEX: 29.28 KG/M2 | WEIGHT: 182.2 LBS

## 2020-07-22 DIAGNOSIS — D50.0 IRON DEFICIENCY ANEMIA DUE TO CHRONIC BLOOD LOSS: Primary | ICD-10-CM

## 2020-07-22 DIAGNOSIS — K29.50 CHRONIC GASTRITIS WITHOUT BLEEDING, UNSPECIFIED GASTRITIS TYPE: ICD-10-CM

## 2020-07-22 PROCEDURE — 99213 OFFICE O/P EST LOW 20 MIN: CPT | Performed by: NURSE PRACTITIONER

## 2020-07-22 NOTE — PATIENT INSTRUCTIONS
Anemia    Anemia is a condition in which you do not have enough red blood cells or hemoglobin. Hemoglobin is a substance in red blood cells that carries oxygen. When you do not have enough red blood cells or hemoglobin (are anemic), your body cannot get enough oxygen and your organs may not work properly. As a result, you may feel very tired or have other problems.  What are the causes?  Common causes of anemia include:  · Excessive bleeding. Anemia can be caused by excessive bleeding inside or outside the body, including bleeding from the intestine or from periods in women.  · Poor nutrition.  · Long-lasting (chronic) kidney, thyroid, and liver disease.  · Bone marrow disorders.  · Cancer and treatments for cancer.  · HIV (human immunodeficiency virus) and AIDS (acquired immunodeficiency syndrome).  · Treatments for HIV and AIDS.  · Spleen problems.  · Blood disorders.  · Infections, medicines, and autoimmune disorders that destroy red blood cells.  What are the signs or symptoms?  Symptoms of this condition include:  · Minor weakness.  · Dizziness.  · Headache.  · Feeling heartbeats that are irregular or faster than normal (palpitations).  · Shortness of breath, especially with exercise.  · Paleness.  · Cold sensitivity.  · Indigestion.  · Nausea.  · Difficulty sleeping.  · Difficulty concentrating.  Symptoms may occur suddenly or develop slowly. If your anemia is mild, you may not have symptoms.  How is this diagnosed?  This condition is diagnosed based on:  · Blood tests.  · Your medical history.  · A physical exam.  · Bone marrow biopsy.  Your health care provider may also check your stool (feces) for blood and may do additional testing to look for the cause of your bleeding.  You may also have other tests, including:  · Imaging tests, such as a CT scan or MRI.  · Endoscopy.  · Colonoscopy.  How is this treated?  Treatment for this condition depends on the cause. If you continue to lose a lot of blood, you may  need to be treated at a hospital. Treatment may include:  · Taking supplements of iron, vitamin B12, or folic acid.  · Taking a hormone medicine (erythropoietin) that can help to stimulate red blood cell growth.  · Having a blood transfusion. This may be needed if you lose a lot of blood.  · Making changes to your diet.  · Having surgery to remove your spleen.  Follow these instructions at home:  · Take over-the-counter and prescription medicines only as told by your health care provider.  · Take supplements only as told by your health care provider.  · Follow any diet instructions that you were given.  · Keep all follow-up visits as told by your health care provider. This is important.  Contact a health care provider if:  · You develop new bleeding anywhere in the body.  Get help right away if:  · You are very weak.  · You are short of breath.  · You have pain in your abdomen or chest.  · You are dizzy or feel faint.  · You have trouble concentrating.  · You have bloody or black, tarry stools.  · You vomit repeatedly or you vomit up blood.  Summary  · Anemia is a condition in which you do not have enough red blood cells or enough of a substance in your red blood cells that carries oxygen (hemoglobin).  · Symptoms may occur suddenly or develop slowly.  · If your anemia is mild, you may not have symptoms.  · This condition is diagnosed with blood tests as well as a medical history and physical exam. Other tests may be needed.  · Treatment for this condition depends on the cause of the anemia.  This information is not intended to replace advice given to you by your health care provider. Make sure you discuss any questions you have with your health care provider.  Document Released: 01/25/2006 Document Revised: 11/30/2018 Document Reviewed: 01/19/2018  [employer] Patient Education © 2020 [employer]

## 2020-07-24 LAB
HGB A MFR BLD: 94.2 % (ref 96.4–98.8)
HGB A2 MFR BLD COLUMN CHROM: 2.1 % (ref 1.8–3.2)
HGB C MFR BLD: 0 %
HGB F MFR BLD: 0 % (ref 0–2)
HGB FRACT BLD-IMP: ABNORMAL
HGB S BLD QL SOLY: ABNORMAL
HGB S MFR BLD: 3.7 %

## 2020-07-28 PROBLEM — K59.00 CONSTIPATION: Status: ACTIVE | Noted: 2020-07-28

## 2020-08-07 RX ORDER — ATORVASTATIN CALCIUM 80 MG/1
80 TABLET, FILM COATED ORAL NIGHTLY
Qty: 30 TABLET | Refills: 2 | Status: SHIPPED | OUTPATIENT
Start: 2020-08-07

## 2020-08-07 RX ORDER — IRBESARTAN 150 MG/1
300 TABLET ORAL NIGHTLY
Qty: 60 TABLET | Refills: 2 | Status: SHIPPED | OUTPATIENT
Start: 2020-08-07

## 2020-08-07 NOTE — TELEPHONE ENCOUNTER
Caller: Kinsey Martinez    Relationship: Self    Best call back number: [phone]    Medication needed:   Requested Prescriptions     Pending Prescriptions Disp Refills   • atorvastatin (LIPITOR) 80 MG tablet       Sig: Take 1 tablet by mouth Every Night.   • irbesartan (AVAPRO) 150 MG tablet       Sig: Take 2 tablets by mouth Every Night.       When do you need the refill by: end of day    What details did the patient provide when requesting the medication: completely out    Does the patient have less than a 3 day supply:  [x] Yes  [] No    What is the patient's preferred pharmacy:  [id number] [address] AT [facility] [phone] [facility] [phone] FX

## 2020-08-17 ENCOUNTER — OFFICE VISIT (OUTPATIENT)
Dept: FAMILY MEDICINE CLINIC | Facility: CLINIC | Age: 55
End: 2020-08-17

## 2020-08-17 VITALS
HEART RATE: 98 BPM | RESPIRATION RATE: 22 BRPM | SYSTOLIC BLOOD PRESSURE: 134 MMHG | DIASTOLIC BLOOD PRESSURE: 80 MMHG | HEIGHT: 66 IN | OXYGEN SATURATION: 99 % | TEMPERATURE: 98.7 F | BODY MASS INDEX: 29.44 KG/M2 | WEIGHT: 183.2 LBS

## 2020-08-17 DIAGNOSIS — D50.0 IRON DEFICIENCY ANEMIA DUE TO CHRONIC BLOOD LOSS: ICD-10-CM

## 2020-08-17 DIAGNOSIS — T14.8XXA SURGICAL WOUND PRESENT: ICD-10-CM

## 2020-08-17 DIAGNOSIS — R13.10 DYSPHAGIA, UNSPECIFIED TYPE: ICD-10-CM

## 2020-08-17 DIAGNOSIS — R53.83 FATIGUE, UNSPECIFIED TYPE: Primary | ICD-10-CM

## 2020-08-17 PROCEDURE — 99213 OFFICE O/P EST LOW 20 MIN: CPT | Performed by: NURSE PRACTITIONER

## 2020-08-17 RX ORDER — PANTOPRAZOLE SODIUM 40 MG/1
40 TABLET, DELAYED RELEASE ORAL DAILY
Qty: 90 TABLET | Refills: 1 | Status: SHIPPED | OUTPATIENT
Start: 2020-08-17

## 2020-08-17 NOTE — PROGRESS NOTES
Chief Complaint   Patient presents with   • Follow-up     1 mo f/u fatigue        Subjective:  Kinsey Martinez is a 54 y.o. female who presents for f/u on fatigue and has concern about her abdominal incision is draining and has odor. Reports contacting surgeon, Dr. Steve Singer, and is currently on 2nd day of amoxicillin BID. Pt currently lives with her daughter and is about to move temporarily to [place] to live with her sister d/t her daughter being deployed. Pt had EGD and colonoscopy performed by Dr. Carbajal in [place], then referred to hem/onc in [facility] who then referred her to Dr. Steve Singer at [place]. She has been set up with hem/onc in [place] to start her chemotherapy.      The following portions of the patient's history were reviewed and updated as appropriate: allergies, current medications, past family history, past medical history, past social history, past surgical history and problem list.    Fatigue   This is a recurrent problem. The current episode started more than 1 month ago. The problem occurs intermittently. The problem has been gradually improving. Associated symptoms include abdominal pain (r/t surgical procedure) and fatigue. Pertinent negatives include no chest pain.   Wound Check   She was originally treated more than 14 days ago. There has been colored discharge from the wound. There is no redness present. There is no swelling present. The pain has not changed.        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    stage 1C2; s/p ex lap, ALENA/BSO, debulking   • GERD (gastroesophageal reflux disease)    • Hypertension          Current Outpatient Medications:   •  ascorbic acid (VITAMIN C) 1000 MG tablet, Take 1 tablet by mouth Daily., Disp: 30 tablet, Rfl: 0  •  atorvastatin (LIPITOR) 80 MG tablet, Take 1 tablet by mouth Every Night., Disp: 30 tablet, Rfl: 2  •  ferrous sulfate (FerrouSul) 325 (65 FE) MG  "tablet, Take 1 tablet by mouth Daily With Breakfast., Disp: 30 tablet, Rfl: 0  •  irbesartan (AVAPRO) 150 MG tablet, Take 2 tablets by mouth Every Night., Disp: 60 tablet, Rfl: 2  •  ondansetron (Zofran) 4 MG tablet, Take 1 tablet by mouth Every 8 (Eight) Hours As Needed for Nausea or Vomiting., Disp: 21 tablet, Rfl: 0  •  pantoprazole (Protonix) 40 MG EC tablet, Take 1 tablet by mouth Daily., Disp: 90 tablet, Rfl: 1  •  polyethylene glycol (MIRALAX) 17 g packet, Take 17g PO BID PRN constipation, Disp: 60 packet, Rfl: 1    Review of Systems    Review of Systems   Constitutional: Positive for fatigue. Negative for activity change and appetite change.   Respiratory: Negative for chest tightness and shortness of breath.    Cardiovascular: Negative for chest pain.   Gastrointestinal: Positive for abdominal pain (r/t surgical procedure).   Skin: Positive for wound (open lower abdominal surgical wound; small amount of purulent drainage present).        Healing surgical incision       Objective  Vitals:    08/17/20 1514   BP: 134/80   BP Location: Left arm   Patient Position: Sitting   Cuff Size: Adult   Pulse: 98   Resp: 22   Temp: 98.7 °F (37.1 °C)   SpO2: 99%   Weight: 83.1 kg (183 lb 3.2 oz)   Height: 167.6 cm (66\")       Physical Exam   Constitutional: She appears well-developed and well-nourished. No distress.   HENT:   Head: Normocephalic and atraumatic.   Wearing face mask d/t pandemic   Eyes: Conjunctivae are normal.   Neck: Normal range of motion.   Cardiovascular: Normal rate, regular rhythm and normal heart sounds.   Pulmonary/Chest: Effort normal and breath sounds normal.   Abdominal: Soft. Bowel sounds are normal. There is tenderness.   Vertical scabbed surgical incision entire length of abdomen; appears to be healing well except for lower aspect at abdominal fold  -   Skin: Skin is warm and dry.   Nursing note and vitals reviewed.        Kinsey was seen today for follow-up.    Diagnoses and all orders for " this visit:    Fatigue, unspecified type    Dysphagia, unspecified type  -     pantoprazole (Protonix) 40 MG EC tablet; Take 1 tablet by mouth Daily.    Iron deficiency anemia due to chronic blood loss    Surgical wound present    1. Fatigue and anemia - improving - seeing Hem/Onc - will start chemo for ovarian cancer.  2. Dysphagia - refill of protonix - take as directed.  3. Surgical wound - advised to keep area clean (using soap and water) and pat dry, then cover with zinc skin barrier cream (Z-Guard), cover with Kotex type pad and change daily. Finish abx prescribed by surgeon.  4. Continue treatments and keep specialty provider appts as scheduled.  5. Advised to call if she needs refills while she is with her sister in [place].  6. RTC PRN.      This document has been electronically signed by KIMBERLEE Winston on August 17, 2020 17:09

## 2020-08-20 ENCOUNTER — APPOINTMENT (OUTPATIENT)
Dept: ONCOLOGY | Facility: CLINIC | Age: 55
End: 2020-08-20

## 2023-05-18 NOTE — PROGRESS NOTES
Chief Complaint   Patient presents with   • Anemia   • Nausea   • Abdominal Pain       Subjective    Kinsey Martinez is a 54 y.o. female. she is here today for follow-up.    History of Present Illness  54-year-old female presents for hospital follow-up regarding anemia and was recently diagnosed with ovarian cancer and has follow-up with oncologist at her home in [place] tomorrow.  Reports she still has abdominal pain while hospitalized she underwent EGD which showed gastritis no signs of bleeding normal esophagus normal duodenum.  Colonoscopy noted no acute abnormality with repeat recommended in 5 years for surveillance.  Reports she was hospitalized in [place] in February and diagnosed with gastric esophageal ulcers.       The following portions of the patient's history were reviewed and updated as appropriate:   Past Medical History:   Diagnosis Date   • Anemia    • GERD (gastroesophageal reflux disease)    • Hypertension      Past Surgical History:   Procedure Laterality Date   • CARDIAC CATHETERIZATION     •  SECTION     •  SECTION     • COLONOSCOPY     • COLONOSCOPY N/A 2020    Procedure: COLONOSCOPY;  Surgeon: Fidel Carbajal MD;  Location: [facility] ENDOSCOPY;  Service: Gastroenterology;  Laterality: N/A;   • ENDOSCOPY N/A 7/15/2020    Procedure: ESOPHAGOGASTRODUODENOSCOPY;  Surgeon: Fidel Carbajal MD;  Location: [facility] ENDOSCOPY;  Service: Gastroenterology;  Laterality: N/A;   • HYSTEROSCOPY W/ POLYPECTOMY     • NERVE REPAIR       Family History   Problem Relation Age of Onset   • Heart disease Mother         CHF   • Diabetes Mother    • Stroke Father    • Colon cancer Sister 35         age 60 (metastases)   • Lung cancer Sister 32        non-smoker;  3 months later   • Breast cancer Cousin    • Breast cancer Cousin    • Uterine cancer Neg Hx      OB History        3    Para   2    Term   2            AB   1    Living   2       SAB        TAB   1    Ectopic      Transperineal Prostate Biopsy Operative Note    Patient: Sukhdeep Ivy 72 year old male    MRN: [id number]    Surgeon(s): Gerhard Patel MD  Phone Number: [phone]                       Surgeon(s) and Role:     * Gerhard Patel MD - Primary    Assistant: None    Pre-Op Diagnosis:   Active surveillance for prostate cancer      Post-Op Diagnosis: Same     Procedure:  Transperineal prostate biopsy    Anesthesia Type: MAC                                   Complications: None    Indications:     This gentleman has history of low-grade prostate cancer.  He has a PSA over 10.  He presents today for prostate needle biopsy in an active surveillance program.  Risks and benefits were discussed in detail with him.  He has history of prostatitis.  He had a prostate MRI 2022 which did not show target lesion.    Pathologic Diagnosis   A:   Prostate, left lateral base, biopsy:  -Benign prostatic glands and stroma     B:   Prostate, lateral base, biopsy:  -Prostatic adenocarcinoma, [place] score 3+3 = 6 (grade group 1); tumor is 2mm in linear extent; approximately 10% of the total core length     C:   Prostate, left lateral MID, biopsy:  -Benign prostatic glands and stroma with partial atrophy     D:   Prostate, left MID, biopsy:  -Benign prostatic glands and stroma with partial atrophy     E:   Prostate, left lateral apex, biopsy:  -Benign prostatic glands and stroma with partial atrophy     F:   Prostate, left apex, biopsy:  -Benign prostatic glands and stroma with partial atrophy with mild chronic prostatitis     G:   Prostate, right base, biopsy:  -Benign prostatic glands and stroma with partial atrophy and mild chronic prostatitis     H:   Prostate, right lateral base, biopsy:  -Benign prostatic glands and stroma with focal partial atrophy     I:   Prostate, right MID, biopsy:  -Benign prostatic glands and stroma with focal partial atrophy     J:   Prostate, right lateral MID, biopsy:  -Benign prostatic glands and     Molar        Multiple        Live Births   2              Prior to Admission medications    Medication Sig Start Date End Date Taking? Authorizing Provider   ascorbic acid (VITAMIN C) 1000 MG tablet Take 1 tablet by mouth Daily. 7/18/20  Yes Kareem Zavala MD   atorvastatin (LIPITOR) 80 MG tablet Take 80 mg by mouth Every Night.   Yes ProviderRomario MD   ferrous sulfate (FerrouSul) 325 (65 FE) MG tablet Take 1 tablet by mouth Daily With Breakfast. 7/18/20  Yes Kareem Zavala MD   irbesartan (AVAPRO) 150 MG tablet Take 300 mg by mouth Every Night.   Yes ProviderRomario MD   ondansetron (Zofran) 4 MG tablet Take 1 tablet by mouth Every 8 (Eight) Hours As Needed for Nausea or Vomiting. 7/18/20  Yes Kareem Zavala MD   pantoprazole (Protonix) 40 MG EC tablet Take 1 tablet by mouth Daily. 7/7/20  Yes Marlee Kilgore APRN   polyethylene glycol (MIRALAX) 17 g packet Take 17g PO BID PRN constipation 7/14/20  Yes Marlee Kilgore APRN   Morphine (MSIR) 15 MG tablet Take 1 tablet by mouth Every 6 (Six) Hours As Needed for Moderate Pain  or Severe Pain  for up to 3 days. 7/18/20 7/21/20  Kareem Zavala MD     Allergies   Allergen Reactions   • Aspirin Anaphylaxis and Angioedema   • Codeine Itching   • Oxycodone Itching   • Tramadol Itching     Social History     Socioeconomic History   • Marital status: Single     Spouse name: Not on file   • Number of children: Not on file   • Years of education: Not on file   • Highest education level: Not on file   Tobacco Use   • Smoking status: Never Smoker   • Smokeless tobacco: Never Used   Substance and Sexual Activity   • Alcohol use: Yes     Frequency: Monthly or less     Comment: occassionally   • Drug use: Never   • Sexual activity: Not Currently       Review of Systems  Review of Systems   Constitutional: Positive for fatigue. Negative for activity change, appetite change, chills, diaphoresis, fever and unexpected weight change.   HENT:  stroma     K:   Prostate, right apex, biopsy:  -Benign prostatic glands and stroma     L:   Prostate, right lateral apex, biopsy:  -Benign prostatic glands and stroma   Electronically signed by Nilay Baca MD on 10/21/2021      PVR:   10 cc     Total prostate volume was 35.00. Prostate length 4.2, width 4.9, height 3.1 cm.        CT Urogram   Dec 2021  IMPRESSION:  1.  The left intrarenal collecting system demonstrates caliectasis with some associated prominence of the left renal pelvis, but there is no evidence for any focal obstructing lesion. Additionally there is no delayed nephrogram or parenchymal volume loss on the left.  2.  There is a suspected tiny, solitary 1 to 2 mm left lower pole  nonobstructing renal calculus at the threshold of detectability.  3.  Suspect mild hepatic steatosis.  4.  Diffuse colonic uncomplicated diverticulosis.  5.  No enhancing renal mass or discrete urothelial abnormality.     Prostate MRI   Jan 2022   FINDINGS:   Prostate Measurements: 5.0 cm transverse, 3.7 cm AP, and 3.9 cm in  craniocaudal dimension. Prostate volume approximately 37.5 cubic cm.  IMPRESSION:  1. Indistinct 2 cm hypointensity at the midline mid central posterior gland, this is probably within normal limits for this location (i.e., the periductal central zone is difficult to accurately assess by MR), PA-2.  2. No other focal suspicious prostate lesion elsewhere.  3. Wedge-shaped and striated T2 hypointensities throughout the peripheral zones without focal corresponding restricted diffusion, presumed sequela of prostatitis (PA-2).  4. No lymphadenopathy or tumor suspicious lesions.      Cystoscopy Dec 2021  FINDINGS: The urethra was of normal caliber.  The prostate is 2 cm.  The bladder neck was open.  Both orifices were in their normal position and configuration with clear efflux seen.  The bladder mucosa showed no obvious tumors, stones, foreign bodies, masses, or suspicious areas.  Additional findings:   "Negative for sore throat and trouble swallowing.    Respiratory: Negative for shortness of breath.    Gastrointestinal: Positive for abdominal pain. Negative for abdominal distention, anal bleeding, blood in stool, constipation, diarrhea, nausea, rectal pain and vomiting.   Musculoskeletal: Negative for arthralgias.   Skin: Negative for pallor.   Neurological: Negative for light-headedness.        /91 (BP Location: Left arm)   Pulse 100   Ht 167.6 cm (66\")   Wt 82.6 kg (182 lb 3.2 oz)   BMI 29.41 kg/m²     Objective    Physical Exam   Constitutional: She is oriented to person, place, and time. She appears well-developed and well-nourished. She is cooperative. No distress.   HENT:   Head: Normocephalic and atraumatic.   Neck: Normal range of motion. Neck supple. No thyromegaly present.   Cardiovascular: Normal rate, regular rhythm and normal heart sounds.   Pulmonary/Chest: Effort normal and breath sounds normal. She has no wheezes. She has no rhonchi. She has no rales.   Abdominal: Soft. Normal appearance and bowel sounds are normal. She exhibits no distension. There is no hepatosplenomegaly. There is generalized tenderness. There is no rigidity and no guarding. No hernia.   Lymphadenopathy:     She has no cervical adenopathy.   Neurological: She is alert and oriented to person, place, and time.   Skin: Skin is warm, dry and intact. No rash noted. No pallor.   Psychiatric: She has a normal mood and affect. Her speech is normal.     Admission on 07/15/2020, Discharged on 07/18/2020   Component Date Value Ref Range Status   • Glucose 07/15/2020 99  65 - 99 mg/dL Final   • BUN 07/15/2020 10  6 - 20 mg/dL Final   • Creatinine 07/15/2020 0.72  0.57 - 1.00 mg/dL Final   • Sodium 07/15/2020 133* 136 - 145 mmol/L Final   • Potassium 07/15/2020 3.8  3.5 - 5.2 mmol/L Final   • Chloride 07/15/2020 98  98 - 107 mmol/L Final   • CO2 07/15/2020 24.0  22.0 - 29.0 mmol/L Final   • Calcium 07/15/2020 10.4  8.6 - 10.5 mg/dL " Moderate bladder trabeculation, moderately full bladder upon entry.    Prostate Specific Antigen (ng/mL)   Date Value   04/12/2023 10.30 (H)     Patient Active Problem List   Diagnosis   • Degeneration of lumbar or lumbosacral intervertebral disc   • Abnormal LFTs   • Somatic dysfunction of lumbar region   • Segmental and somatic dysfunction of pelvic region   • Benign prostatic hyperplasia with weak urinary stream   • History of alcohol abuse   • Diverticulosis of large intestine without hemorrhage   • Thyroid nodule- consulted endo . no f/u needed   • Prostate cancer (CMD)   • Segmental and somatic dysfunction of sacral region   • Right sided sciatica   • [place] coronary artery calcium score between 100 and 199        Operative Description and Findings:   I performed the biopsy today with an indication of low-grade, intermediate risk prostate cancer based on prior histologic evaluation and current PSA over 10.    After obtaining consent, the patient was placed in high lithotomy position using Yellow Fin stirrups.  All pressure points were pads. The scrotum was retracted cephalad and the perineum was prepped in standard fashion.    The transrectal ultrasound probe was placed without difficulty.  The prostate volume was calculated to be 40 mL.  The gland was symmetrical and of homogeneous echotexture. 1% Xylocaine was injected in the anterolateral perianal skin at the 1 and 11 o'clock locations.  Using the [employer] access system, the carriage was attached to the probe and the access needle was engage in the pre-anesthestized skin. A prostatic block was then performed in the pelvic floor musculature and prostatic apical junction using 30 cc of 1% Xylocaine. The prostate was then mapped into 10 stereotactic sectors corresponding to the right and left posterior medial, posterior lateral, base, anterior medial, and anterior lateral zones. Using a Hiri Russ-cut biopsy gun, representative samples from each zone  Final   • eGFR   Amer 07/15/2020 103  >60 mL/min/1.73 Final   • BUN/Creatinine Ratio 07/15/2020 13.9  7.0 - 25.0 Final   • Anion Gap 07/15/2020 11.0  5.0 - 15.0 mmol/L Final   • Protime 07/15/2020 15.1  11.1 - 15.3 Seconds Final   • INR 07/15/2020 1.14  0.80 - 1.20 Final   • PTT 07/15/2020 35.4  20.0 - 40.3 seconds Final   • WBC 07/15/2020 11.71* 3.40 - 10.80 10*3/mm3 Final   • RBC 07/15/2020 3.52* 3.77 - 5.28 10*6/mm3 Final   • Hemoglobin 07/15/2020 6.4* 12.0 - 15.9 g/dL Final    SPECIMEN REANALYZED TO CONFIRM HGB RESULTS   • Hematocrit 07/15/2020 23.0* 34.0 - 46.6 % Final   • MCV 07/15/2020 65.3* 79.0 - 97.0 fL Final   • MCH 07/15/2020 18.2* 26.6 - 33.0 pg Final   • MCHC 07/15/2020 27.8* 31.5 - 35.7 g/dL Final   • RDW 07/15/2020 20.1* 12.3 - 15.4 % Final   • RDW-SD 07/15/2020 46.6  37.0 - 54.0 fl Final   • MPV 07/15/2020 8.7  6.0 - 12.0 fL Final   • Platelets 07/15/2020 592* 140 - 450 10*3/mm3 Final   • ABO Type 07/15/2020 O   Final   • RH type 07/15/2020 Positive   Final   • Antibody Screen 07/15/2020 Negative   Final   • T&S Expiration Date 07/15/2020 7/18/2020 11:59:59 PM   Final   • Previous History 07/15/2020 No record on File   Final   • Neutrophil % 07/15/2020 77.0* 42.7 - 76.0 % Final   • Lymphocyte % 07/15/2020 16.0* 19.6 - 45.3 % Final   • Monocyte % 07/15/2020 7.0  5.0 - 12.0 % Final   • Neutrophils Absolute 07/15/2020 9.02* 1.70 - 7.00 10*3/mm3 Final   • Lymphocytes Absolute 07/15/2020 1.87  0.70 - 3.10 10*3/mm3 Final   • Monocytes Absolute 07/15/2020 0.82  0.10 - 0.90 10*3/mm3 Final   • Anisocytosis 07/15/2020 Slight/1+  None Seen Final   • Hypochromia 07/15/2020 Mod/2+  None Seen Final   • Ovalocytes 07/15/2020 Slight/1+  None Seen Final   • Target Cells 07/15/2020 Slight/1+  None Seen Final   • WBC Morphology 07/15/2020 Normal  Normal Final   • Platelet Estimate 07/15/2020 Increased  Normal Final   • ABO Type 07/15/2020 O   Final   • RH type 07/15/2020 Positive   Final   • Iron 07/15/2020 19*  were obtained and sent for pathology in formalin. The patient tolerated the procedure well without evidence of hemorrage.  He will follow up for the results of his biopsies in the near future.  He was counseled extensively on risks of bleeding and infection and instructed to call the on call urologist and present to his local ER for any fevers >101, chills, dizziness and/or lethargy.     Specimens Removed:   ID Type Source Tests Collected by Time   A :  Tissue Prostate SURGICAL PATHOLOGY Gerhard Patel MD 5/18/2023 0744        Estimated Blood Loss: Minimal     Implants: None    Gerhard Patel MD   [facility]  Office [phone]     DISCLAIMER  The 21st Century Cures Act makes medical notes like these available to patients in the interest of transparency. However, be advised this is a medical document. It is intended as peer to peer communication. It is written in medical language and may contain abbreviations or verbiage that are unfamiliar to the general public. It may appear blunt or direct. Medical documents are intended to carry relevant information, facts as evident, and the clinical opinion of the practitioner. This document is not intended to be a comprehensive summary of the medical record. Plans may change based on information that is not conveyed in this note.          37 - 145 mcg/dL Final   • Iron Saturation 07/15/2020 10* 20 - 50 % Final   • Transferrin 07/15/2020 122* 200 - 360 mg/dL Final   • TIBC 07/15/2020 182* 298 - 536 mcg/dL Final   • Ferritin 07/15/2020 957.40* 13.00 - 150.00 ng/mL Final   • Hemoglobin A1C 07/15/2020 5.90* 4.80 - 5.60 % Final   • Product Code 07/17/2020 T1739H93   Final   • Unit Number 07/17/2020 [id number]   Final   • UNIT  ABO 07/17/2020 O   Final   • UNIT  RH 07/17/2020 POS   Final   • Crossmatch Interpretation 07/17/2020 Compatible   Final   • Dispense Status 07/17/2020 PT   Final   • Blood Expiration Date 07/17/2020 202008052359   Final   • Product Code 07/17/2020 S2368M41   Final   • Unit Number 07/17/2020 [id number]   Final   • UNIT  ABO 07/17/2020 O   Final   • UNIT  RH 07/17/2020 POS   Final   • Crossmatch Interpretation 07/17/2020 Compatible   Final   • Dispense Status 07/17/2020 PT   Final   • Blood Expiration Date 07/17/2020 202008052359   Final   • COVID19 07/15/2020 Not Detected  Not Detected - Ref. Range Final   • Case Report 07/15/2020    Final                    Value:Surgical Pathology Report                         Case: [id number]                                  Authorizing Provider:  Fidel Carbajal MD        Collected:           07/15/2020 04:07 PM          Ordering Location:     [facility]             Received:            07/16/2020 07:26 AM                                 [place] [facility] [facility]                                                     Pathologist:           Rehan Camacho MD                                                            Specimen:    Gastric, Antrum                                                                           • Final Diagnosis 07/15/2020    Final                    Value:This result contains rich text formatting which cannot be displayed here.   • Glucose 07/16/2020 92  65 - 99 mg/dL Final   • BUN 07/16/2020 7  6 - 20 mg/dL Final   • Creatinine 07/16/2020 0.69  0.57 - 1.00  mg/dL Final   • Sodium 07/16/2020 134* 136 - 145 mmol/L Final   • Potassium 07/16/2020 4.0  3.5 - 5.2 mmol/L Final    Specimen hemolyzed.  Results may be affected.   • Chloride 07/16/2020 102  98 - 107 mmol/L Final   • CO2 07/16/2020 22.0  22.0 - 29.0 mmol/L Final   • Calcium 07/16/2020 9.7  8.6 - 10.5 mg/dL Final   • eGFR   Amer 07/16/2020 108  >60 mL/min/1.73 Final   • BUN/Creatinine Ratio 07/16/2020 10.1  7.0 - 25.0 Final   • Anion Gap 07/16/2020 10.0  5.0 - 15.0 mmol/L Final   • Immunofixation Result, Serum 07/16/2020 Comment   Final    No monoclonality detected.   • IgG 07/16/2020 2188* 586 - 1602 mg/dL Final   • IgA 07/16/2020 555* 87 - 352 mg/dL Final   • IgM 07/16/2020 162  26 - 217 mg/dL Final   • Free Light Chain, Kappa 07/16/2020 59.1* 3.3 - 19.4 mg/L Final   • Free Lambda Light Chains 07/16/2020 46.5* 5.7 - 26.3 mg/L Final   • Kappa/Lambda Ratio 07/16/2020 1.27  0.26 - 1.65 Final   • Total Protein 07/16/2020 7.7  6.0 - 8.5 g/dL Final   • Albumin 07/16/2020 2.4* 2.9 - 4.4 g/dL Final   • Alpha-1-Globulin 07/16/2020 0.5* 0.0 - 0.4 g/dL Final   • Alpha-2-Globulin 07/16/2020 1.4* 0.4 - 1.0 g/dL Final   • Beta Globulin 07/16/2020 1.2  0.7 - 1.3 g/dL Final   • Gamma Globulin 07/16/2020 2.2* 0.4 - 1.8 g/dL Final   • M-Natan 07/16/2020 Not Observed  Not Observed g/dL Final   • Globulin 07/16/2020 5.3* 2.2 - 3.9 g/dL Final   • A/G Ratio 07/16/2020 0.5* 0.7 - 1.7 Final   • Please note 07/16/2020 Comment   Final    Protein electrophoresis scan will follow via computer, mail, or   delivery.   • SPE Interpretation 07/16/2020 Comment   Final    The SPE pattern demonstrates elevation of regions containing acute  phase proteins suggesting an acute/subacute inflammatory response.  Some conditions in which this pattern has been observed include:  bacterial, viral or parasitic infection; mechanical, physical or  chemical trauma; and cardiac failure. The gamma globulin region is  unremarkable and evidence of  monoclonal protein is not apparent.   • Immature Reticulocyte Fraction 07/16/2020 22.9* 3.0 - 15.8 % Final   • Reticulocyte % 07/16/2020 0.94  0.70 - 1.90 % Final   • Reticulocyte Hgb 07/16/2020 20.5* 29.8 - 36.1 pg Final   • Vitamin B-12 07/16/2020 590  211 - 946 pg/mL Final   • Folate 07/16/2020 9.07  4.78 - 24.20 ng/mL Final   • WBC 07/16/2020 13.29* 3.40 - 10.80 10*3/mm3 Final   • RBC 07/16/2020 4.12  3.77 - 5.28 10*6/mm3 Final   • Hemoglobin 07/16/2020 8.3* 12.0 - 15.9 g/dL Final   • Hematocrit 07/16/2020 28.0* 34.0 - 46.6 % Final   • MCV 07/16/2020 68.0* 79.0 - 97.0 fL Final   • MCH 07/16/2020 20.1* 26.6 - 33.0 pg Final   • MCHC 07/16/2020 29.6* 31.5 - 35.7 g/dL Final   • RDW 07/16/2020 20.8* 12.3 - 15.4 % Final   • RDW-SD 07/16/2020 50.3  37.0 - 54.0 fl Final   • MPV 07/16/2020 8.6  6.0 - 12.0 fL Final   • Platelets 07/16/2020 528* 140 - 450 10*3/mm3 Final   • Neutrophil % 07/16/2020 73.1  42.7 - 76.0 % Final   • Lymphocyte % 07/16/2020 17.9* 19.6 - 45.3 % Final   • Monocyte % 07/16/2020 7.8  5.0 - 12.0 % Final   • Eosinophil % 07/16/2020 0.5  0.3 - 6.2 % Final   • Basophil % 07/16/2020 0.3  0.0 - 1.5 % Final   • Immature Grans % 07/16/2020 0.4  0.0 - 0.5 % Final   • Neutrophils, Absolute 07/16/2020 9.71* 1.70 - 7.00 10*3/mm3 Final   • Lymphocytes, Absolute 07/16/2020 2.38  0.70 - 3.10 10*3/mm3 Final   • Monocytes, Absolute 07/16/2020 1.04* 0.10 - 0.90 10*3/mm3 Final   • Eosinophils, Absolute 07/16/2020 0.07  0.00 - 0.40 10*3/mm3 Final   • Basophils, Absolute 07/16/2020 0.04  0.00 - 0.20 10*3/mm3 Final   • Immature Grans, Absolute 07/16/2020 0.05  0.00 - 0.05 10*3/mm3 Final   • nRBC 07/16/2020 0.0  0.0 - 0.2 /100 WBC Final   • CEA 07/16/2020 <0.60  ng/mL Final   •  07/16/2020 522.8* 0.0 - 38.1 U/mL Final   • Glucose 07/17/2020 87  65 - 99 mg/dL Final   • BUN 07/17/2020 4* 6 - 20 mg/dL Final   • Creatinine 07/17/2020 0.60  0.57 - 1.00 mg/dL Final   • Sodium 07/17/2020 136  136 - 145 mmol/L Final   •  Potassium 07/17/2020 4.1  3.5 - 5.2 mmol/L Final   • Chloride 07/17/2020 101  98 - 107 mmol/L Final   • CO2 07/17/2020 23.0  22.0 - 29.0 mmol/L Final   • Calcium 07/17/2020 9.7  8.6 - 10.5 mg/dL Final   • eGFR   Amer 07/17/2020 127  >60 mL/min/1.73 Final   • BUN/Creatinine Ratio 07/17/2020 6.7* 7.0 - 25.0 Final   • Anion Gap 07/17/2020 12.0  5.0 - 15.0 mmol/L Final   • WBC 07/17/2020 12.27* 3.40 - 10.80 10*3/mm3 Final   • RBC 07/17/2020 4.09  3.77 - 5.28 10*6/mm3 Final   • Hemoglobin 07/17/2020 8.1* 12.0 - 15.9 g/dL Final   • Hematocrit 07/17/2020 27.5* 34.0 - 46.6 % Final   • MCV 07/17/2020 67.2* 79.0 - 97.0 fL Final   • MCH 07/17/2020 19.8* 26.6 - 33.0 pg Final   • MCHC 07/17/2020 29.5* 31.5 - 35.7 g/dL Final   • RDW 07/17/2020 21.1* 12.3 - 15.4 % Final   • RDW-SD 07/17/2020 50.0  37.0 - 54.0 fl Final   • MPV 07/17/2020 8.7  6.0 - 12.0 fL Final   • Platelets 07/17/2020 504* 140 - 450 10*3/mm3 Final   • Neutrophil % 07/17/2020 71.3  42.7 - 76.0 % Final   • Lymphocyte % 07/17/2020 17.4* 19.6 - 45.3 % Final   • Monocyte % 07/17/2020 9.2  5.0 - 12.0 % Final   • Eosinophil % 07/17/2020 1.2  0.3 - 6.2 % Final   • Basophil % 07/17/2020 0.4  0.0 - 1.5 % Final   • Immature Grans % 07/17/2020 0.5  0.0 - 0.5 % Final   • Neutrophils, Absolute 07/17/2020 8.74* 1.70 - 7.00 10*3/mm3 Final   • Lymphocytes, Absolute 07/17/2020 2.14  0.70 - 3.10 10*3/mm3 Final   • Monocytes, Absolute 07/17/2020 1.13* 0.10 - 0.90 10*3/mm3 Final   • Eosinophils, Absolute 07/17/2020 0.15  0.00 - 0.40 10*3/mm3 Final   • Basophils, Absolute 07/17/2020 0.05  0.00 - 0.20 10*3/mm3 Final   • Immature Grans, Absolute 07/17/2020 0.06* 0.00 - 0.05 10*3/mm3 Final   • nRBC 07/17/2020 0.0  0.0 - 0.2 /100 WBC Final   • Glucose 07/18/2020 89  65 - 99 mg/dL Final   • BUN 07/18/2020 5* 6 - 20 mg/dL Final   • Creatinine 07/18/2020 0.62  0.57 - 1.00 mg/dL Final   • Sodium 07/18/2020 134* 136 - 145 mmol/L Final   • Potassium 07/18/2020 3.7  3.5 - 5.2 mmol/L Final     Specimen hemolyzed.  Results may be affected.   • Chloride 07/18/2020 103  98 - 107 mmol/L Final   • CO2 07/18/2020 22.0  22.0 - 29.0 mmol/L Final   • Calcium 07/18/2020 9.7  8.6 - 10.5 mg/dL Final   • eGFR   Amer 07/18/2020 122  >60 mL/min/1.73 Final   • BUN/Creatinine Ratio 07/18/2020 8.1  7.0 - 25.0 Final   • Anion Gap 07/18/2020 9.0  5.0 - 15.0 mmol/L Final   • WBC 07/18/2020 13.20* 3.40 - 10.80 10*3/mm3 Final   • RBC 07/18/2020 3.98  3.77 - 5.28 10*6/mm3 Final   • Hemoglobin 07/18/2020 7.9* 12.0 - 15.9 g/dL Final   • Hematocrit 07/18/2020 26.4* 34.0 - 46.6 % Final   • MCV 07/18/2020 66.3* 79.0 - 97.0 fL Final   • MCH 07/18/2020 19.8* 26.6 - 33.0 pg Final   • MCHC 07/18/2020 29.9* 31.5 - 35.7 g/dL Final   • RDW 07/18/2020 21.5* 12.3 - 15.4 % Final   • RDW-SD 07/18/2020 50.9  37.0 - 54.0 fl Final   • MPV 07/18/2020 8.4  6.0 - 12.0 fL Final   • Platelets 07/18/2020 429  140 - 450 10*3/mm3 Final   • Neutrophil % 07/18/2020 73.0  42.7 - 76.0 % Final   • Lymphocyte % 07/18/2020 16.7* 19.6 - 45.3 % Final   • Monocyte % 07/18/2020 8.4  5.0 - 12.0 % Final   • Eosinophil % 07/18/2020 0.9  0.3 - 6.2 % Final   • Basophil % 07/18/2020 0.5  0.0 - 1.5 % Final   • Immature Grans % 07/18/2020 0.5  0.0 - 0.5 % Final   • Neutrophils, Absolute 07/18/2020 9.65* 1.70 - 7.00 10*3/mm3 Final   • Lymphocytes, Absolute 07/18/2020 2.20  0.70 - 3.10 10*3/mm3 Final   • Monocytes, Absolute 07/18/2020 1.11* 0.10 - 0.90 10*3/mm3 Final   • Eosinophils, Absolute 07/18/2020 0.12  0.00 - 0.40 10*3/mm3 Final   • Basophils, Absolute 07/18/2020 0.06  0.00 - 0.20 10*3/mm3 Final   • Immature Grans, Absolute 07/18/2020 0.06* 0.00 - 0.05 10*3/mm3 Final   • nRBC 07/18/2020 0.0  0.0 - 0.2 /100 WBC Final     Assessment/Plan      1. Iron deficiency anemia due to chronic blood loss    2. Chronic gastritis without bleeding, unspecified gastritis type    .   Recommend capsule endoscopy due to continuing iron deficiency anemia negative EGD and  colonoscopy.  Patient has follow-up in [place] with oncologist tomorrow and is going to establish with GI closer to home to follow-up with any further work-up needed.  She will follow-up in [place] if needed we can schedule procedure here if that works better for her.    Orders placed during this encounter include:  No orders of the defined types were placed in this encounter.      * Surgery not found *    Review and/or summary of lab tests, radiology, procedures, medications. Review and summary of old records and obtaining of history. The risks and benefits of my recommendations, as well as other treatment options were discussed with the patient today. Questions were answered.    No orders of the defined types were placed in this encounter.      Follow-up: Return if symptoms worsen or fail to improve, for Recheck.          This document has been electronically signed by KIMBERLEE Pennington on July 22, 2020 13:11             Results for orders placed or performed during the hospital encounter of 07/15/20   PREVIOUS HISTORY   Result Value Ref Range    Previous History No record on File    Protein Elec + Interp, Serum   Result Value Ref Range    Total Protein 7.7 6.0 - 8.5 g/dL    Albumin 2.4 (L) 2.9 - 4.4 g/dL    Alpha-1-Globulin 0.5 (H) 0.0 - 0.4 g/dL    Alpha-2-Globulin 1.4 (H) 0.4 - 1.0 g/dL    Beta Globulin 1.2 0.7 - 1.3 g/dL    Gamma Globulin 2.2 (H) 0.4 - 1.8 g/dL    M-Natan Not Observed Not Observed g/dL    Globulin 5.3 (H) 2.2 - 3.9 g/dL    A/G Ratio 0.5 (L) 0.7 - 1.7    Please note Comment     SPE Interpretation Comment    COVID-19, [facility] IN-HOUSE, NP SWAB IN TRANSPORT MEDIA 8-10 HR TAT - Swab, Nasopharynx   Result Value Ref Range    COVID19 Not Detected Not Detected - Ref. Range   ABO RH Specimen Verification   Result Value Ref Range    ABO Type O     RH type Positive    Retic With IRF & RET-He   Result Value Ref Range    Immature Reticulocyte Fraction 22.9 (H) 3.0 - 15.8 %    Reticulocyte % 0.94  0.70 - 1.90 %    Reticulocyte Hgb 20.5 (L) 29.8 - 36.1 pg   Immunofixation, Serum   Result Value Ref Range    Immunofixation Result, Serum Comment     IgG 2188 (H) 586 - 1602 mg/dL    IgA 555 (H) 87 - 352 mg/dL    IgM 162 26 - 217 mg/dL   CBC Auto Differential   Result Value Ref Range    WBC 13.20 (H) 3.40 - 10.80 10*3/mm3    RBC 3.98 3.77 - 5.28 10*6/mm3    Hemoglobin 7.9 (L) 12.0 - 15.9 g/dL    Hematocrit 26.4 (L) 34.0 - 46.6 %    MCV 66.3 (L) 79.0 - 97.0 fL    MCH 19.8 (L) 26.6 - 33.0 pg    MCHC 29.9 (L) 31.5 - 35.7 g/dL    RDW 21.5 (H) 12.3 - 15.4 %    RDW-SD 50.9 37.0 - 54.0 fl    MPV 8.4 6.0 - 12.0 fL    Platelets 429 140 - 450 10*3/mm3    Neutrophil % 73.0 42.7 - 76.0 %    Lymphocyte % 16.7 (L) 19.6 - 45.3 %    Monocyte % 8.4 5.0 - 12.0 %    Eosinophil % 0.9 0.3 - 6.2 %    Basophil % 0.5 0.0 - 1.5 %    Immature Grans % 0.5 0.0 - 0.5 %    Neutrophils, Absolute 9.65 (H) 1.70 - 7.00 10*3/mm3    Lymphocytes, Absolute 2.20 0.70 - 3.10 10*3/mm3    Monocytes, Absolute 1.11 (H) 0.10 - 0.90 10*3/mm3    Eosinophils, Absolute 0.12 0.00 - 0.40 10*3/mm3    Basophils, Absolute 0.06 0.00 - 0.20 10*3/mm3    Immature Grans, Absolute 0.06 (H) 0.00 - 0.05 10*3/mm3    nRBC 0.0 0.0 - 0.2 /100 WBC   CBC Auto Differential   Result Value Ref Range    WBC 12.27 (H) 3.40 - 10.80 10*3/mm3    RBC 4.09 3.77 - 5.28 10*6/mm3    Hemoglobin 8.1 (L) 12.0 - 15.9 g/dL    Hematocrit 27.5 (L) 34.0 - 46.6 %    MCV 67.2 (L) 79.0 - 97.0 fL    MCH 19.8 (L) 26.6 - 33.0 pg    MCHC 29.5 (L) 31.5 - 35.7 g/dL    RDW 21.1 (H) 12.3 - 15.4 %    RDW-SD 50.0 37.0 - 54.0 fl    MPV 8.7 6.0 - 12.0 fL    Platelets 504 (H) 140 - 450 10*3/mm3    Neutrophil % 71.3 42.7 - 76.0 %    Lymphocyte % 17.4 (L) 19.6 - 45.3 %    Monocyte % 9.2 5.0 - 12.0 %    Eosinophil % 1.2 0.3 - 6.2 %    Basophil % 0.4 0.0 - 1.5 %    Immature Grans % 0.5 0.0 - 0.5 %    Neutrophils, Absolute 8.74 (H) 1.70 - 7.00 10*3/mm3    Lymphocytes, Absolute 2.14 0.70 - 3.10 10*3/mm3    Monocytes,  Absolute 1.13 (H) 0.10 - 0.90 10*3/mm3    Eosinophils, Absolute 0.15 0.00 - 0.40 10*3/mm3    Basophils, Absolute 0.05 0.00 - 0.20 10*3/mm3    Immature Grans, Absolute 0.06 (H) 0.00 - 0.05 10*3/mm3    nRBC 0.0 0.0 - 0.2 /100 WBC   CBC Auto Differential   Result Value Ref Range    WBC 13.29 (H) 3.40 - 10.80 10*3/mm3    RBC 4.12 3.77 - 5.28 10*6/mm3    Hemoglobin 8.3 (L) 12.0 - 15.9 g/dL    Hematocrit 28.0 (L) 34.0 - 46.6 %    MCV 68.0 (L) 79.0 - 97.0 fL    MCH 20.1 (L) 26.6 - 33.0 pg    MCHC 29.6 (L) 31.5 - 35.7 g/dL    RDW 20.8 (H) 12.3 - 15.4 %    RDW-SD 50.3 37.0 - 54.0 fl    MPV 8.6 6.0 - 12.0 fL    Platelets 528 (H) 140 - 450 10*3/mm3    Neutrophil % 73.1 42.7 - 76.0 %    Lymphocyte % 17.9 (L) 19.6 - 45.3 %    Monocyte % 7.8 5.0 - 12.0 %    Eosinophil % 0.5 0.3 - 6.2 %    Basophil % 0.3 0.0 - 1.5 %    Immature Grans % 0.4 0.0 - 0.5 %    Neutrophils, Absolute 9.71 (H) 1.70 - 7.00 10*3/mm3    Lymphocytes, Absolute 2.38 0.70 - 3.10 10*3/mm3    Monocytes, Absolute 1.04 (H) 0.10 - 0.90 10*3/mm3    Eosinophils, Absolute 0.07 0.00 - 0.40 10*3/mm3    Basophils, Absolute 0.04 0.00 - 0.20 10*3/mm3    Immature Grans, Absolute 0.05 0.00 - 0.05 10*3/mm3    nRBC 0.0 0.0 - 0.2 /100 WBC   CBC Auto Differential   Result Value Ref Range    WBC 11.71 (H) 3.40 - 10.80 10*3/mm3    RBC 3.52 (L) 3.77 - 5.28 10*6/mm3    Hemoglobin 6.4 (C) 12.0 - 15.9 g/dL    Hematocrit 23.0 (L) 34.0 - 46.6 %    MCV 65.3 (L) 79.0 - 97.0 fL    MCH 18.2 (L) 26.6 - 33.0 pg    MCHC 27.8 (L) 31.5 - 35.7 g/dL    RDW 20.1 (H) 12.3 - 15.4 %    RDW-SD 46.6 37.0 - 54.0 fl    MPV 8.7 6.0 - 12.0 fL    Platelets 592 (H) 140 - 450 10*3/mm3     *Note: Due to a large number of results and/or encounters for the requested time period, some results have not been displayed. A complete set of results can be found in Results Review.

## (undated) DEVICE — CANN SMPL SOFTECH BIFLO ETCO2 A/M 7FT

## (undated) DEVICE — SINGLE-USE BIOPSY FORCEPS: Brand: RADIAL JAW 4

## (undated) DEVICE — BITEBLOCK ENDO W/STRAP 60F A/ LF DISP